# Patient Record
Sex: FEMALE | Race: BLACK OR AFRICAN AMERICAN | NOT HISPANIC OR LATINO | Employment: STUDENT | URBAN - METROPOLITAN AREA
[De-identification: names, ages, dates, MRNs, and addresses within clinical notes are randomized per-mention and may not be internally consistent; named-entity substitution may affect disease eponyms.]

---
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
## 2018-02-08 ENCOUNTER — OFFICE VISIT (OUTPATIENT)
Dept: PSYCHIATRY | Facility: CLINIC | Age: 18
End: 2018-02-08
Attending: PSYCHOLOGIST
Payer: COMMERCIAL

## 2018-02-08 DIAGNOSIS — F43.10 PTSD (POST-TRAUMATIC STRESS DISORDER): Primary | ICD-10-CM

## 2018-02-08 NOTE — MR AVS SNAPSHOT
After Visit Summary   2/8/2018    Lakisha Calixto    MRN: [id number]           Patient Information     Date Of Birth          2000        Visit Information        Provider Department      2/8/2018 4:00 PM [facility]      Today's Diagnoses     PTSD (post-traumatic stress disorder)    -  1       Follow-ups after your visit        Your next 10 appointments already scheduled     Mar 08, 2018  4:00 PM CST   Adult Psychotherapy with Maire Ledezma   Psychiatry Clinic ([facility])    [facility]  [address]  [address] [address]   [phone]            Mar 15, 2018  4:00 PM CDT   Adult Psychotherapy with Marie Ledezma   Psychiatry Clinic ([facility])    [facility]  [address]  [address] [address]   [phone]            Mar 22, 2018  4:00 PM CDT   Adult Psychotherapy with Marie Ledezma   Psychiatry Clinic ([facility])    [facility]  [address]  [address] [address]   [phone]            Mar 29, 2018  4:00 PM CDT   Adult Psychotherapy with Marie Ledezma   Psychiatry Clinic ([facility])    [facility]  [address]  [address] [address]   [phone]            Apr 05, 2018  4:00 PM CDT   Adult Psychotherapy with Marie Ledezma   Psychiatry Clinic ([facility])    [facility]  [address]  [address] [address]   [phone]            Apr 12, 2018  4:00 PM CDT   Adult Psychotherapy with Marie Ledezma   Psychiatry Clinic ([facility])    [facility]  [address]  [address] [address]   [phone]            Apr 19, 2018  4:00 PM CDT   Adult Psychotherapy with Marie Ledezma   Psychiatry Clinic ([facility])    [facility]  [address]  [address]  [facility] [id number] [phone]            2018  4:00 PM CDT   Adult Psychotherapy with Marie Ledezma   Psychiatry Clinic ([facility])    [address]  [address] [id number] [phone]              Who to contact     Please call your clinic at [phone] to:    Ask questions about your health    Make or cancel appointments    Discuss your medicines    Learn about your test results    Speak to your doctor            Additional Information About Your Visit        Pathwork DiagnosticsharCrossChx Information     Proficiency is an electronic gateway that provides easy, online access to your medical records. With Proficiency, you can request a clinic appointment, read your test results, renew a prescription or communicate with your care team.     To sign up for Proficiency visit the website at [email or web address]   You will be asked to enter the access code listed below, as well as some personal information. Please follow the directions to create your username and password.     Your access code is: [email or web address]  Expires: 2018  3:05 PM     Your access code will  in 90 days. If you need help or a new code, please contact your [facility] or call [phone] for assistance.        Care EveryWhere ID     This is your Care EveryWhere ID. This could be used by other organizations to access your [place] medical records  [id number]         Blood Pressure from Last 3 Encounters:   17 124/77   17 115/76   16 133/80    Weight from Last 3 Encounters:   17 106.2 kg (234 lb 3.2 oz) (>99 %)*   17 102.9 kg (226 lb 12.8 oz) (99 %)*   16 100.4 kg (221 lb 6.4 oz) (99 %)*     * Growth percentiles are based on CDC 2-20 Years data.              Today, you had the following     No orders found for display       Primary Care Provider Office Phone # Fax #    Deb Wood [phone]        [facility] [address] [place] [address]        Equal Access to Services     UGO CLEMENS : Hadii aad ku hadlillyalexis Cisneros, wachanellda bulmaro, qaybta kaclarencewood issa, fermin wileynormannader lanza. So [facility] [phone].    ATENCIÓN: Si habla español, tiene a herrera disposición servicios gratuitos de asistencia lingüística. [facility] [phone].    We comply with applicable federal civil rights laws and [place] laws. We do not discriminate on the basis of race, color, national origin, age, disability, sex, sexual orientation, or gender identity.            Thank you!     Thank you for choosing PSYCHIATRY CLINIC  for your care. Our goal is always to provide you with excellent care. Hearing back from our patients is one way we can continue to improve our services. Please take a few minutes to complete the written survey that you may receive in the mail after your visit with us. Thank you!             Your Updated Medication List - Protect others around you: Learn how to safely use, store and throw away your medicines at www.disposemymeds.org.          This list is accurate as of 2/8/18 11:59 PM.  Always use your most recent med list.                   Brand Name Dispense Instructions for use Diagnosis    acetaminophen 325 MG tablet    TYLENOL    100 tablet    Take 2 tablets (650 mg) by mouth every 4 hours as needed for mild pain        albuterol 108 (90 BASE) MCG/ACT Inhaler    PROAIR HFA/PROVENTIL HFA/VENTOLIN HFA    1 Inhaler    Inhale 2 puffs into the lungs every 6 hours as needed for shortness of breath / dyspnea or wheezing (before activity)        cyclobenzaprine 10 MG tablet    FLEXERIL    30 tablet    Take 0.5-1 tablets (5-10 mg) by mouth 3 times daily as needed for muscle spasms    Lumbar sprain, initial encounter       ferrous sulfate 325 (65 FE) MG tablet    IRON    60 tablet    Take 1 tablet (325 mg) by mouth 2 times daily        ibuprofen 200 MG capsule     60 capsule     Take 200 mg by mouth every 4 hours as needed for fever        levonorgestrel-ethinyl estradiol 0.1-20 MG-MCG per tablet    AVIANE,ALESSE,LESSINA    84 tablet    Take 1 tablet by mouth daily Take at first 1 pill every 6 hours until bleeding slows down.        vitamin D 2000 UNITS tablet     100 tablet    Take 2,000 Units by mouth daily

## 2018-02-15 ENCOUNTER — OFFICE VISIT (OUTPATIENT)
Dept: PSYCHIATRY | Facility: CLINIC | Age: 18
End: 2018-02-15
Attending: PSYCHOLOGIST
Payer: COMMERCIAL

## 2018-02-15 DIAGNOSIS — F43.10 POSTTRAUMATIC STRESS DISORDER: Primary | ICD-10-CM

## 2018-02-15 NOTE — MR AVS SNAPSHOT
After Visit Summary   2/15/2018    Lakisha Calixto    MRN: [id number]           Patient Information     Date Of Birth          2000        Visit Information        Provider Department      2/15/2018 4:00 PM [facility]        Today's Diagnoses     Posttraumatic stress disorder    -  1       Follow-ups after your visit        Your next 10 appointments already scheduled     Mar 15, 2018  4:00 PM CDT   Adult Psychotherapy with Marie Ledezma   Psychiatry Clinic ([facility])    [facility]  [address]  [address] [address]   [phone]            Mar 22, 2018  4:00 PM CDT   Adult Psychotherapy with Marie Ledezma   Psychiatry Clinic ([facility])    [facility]  [address]  [address] [address]   [phone]            Mar 29, 2018  4:00 PM CDT   Adult Psychotherapy with Marie Ledezma   Psychiatry Clinic ([facility])    [facility]  [address]  [address] [address]   [phone]            Apr 05, 2018  4:00 PM CDT   Adult Psychotherapy with Marie Ledezma   Psychiatry Clinic ([facility])    [facility]  [address]  [address] [address]   [phone]            Apr 12, 2018  4:00 PM CDT   Adult Psychotherapy with Marie Ledezma   Psychiatry Clinic ([facility])    [facility]  [address]  [address] [address]   [phone]            Apr 19, 2018  4:00 PM CDT   Adult Psychotherapy with Marie Ledezma   Psychiatry Clinic ([facility])    [facility]  [address]  [address] [address]   [phone]            Apr 26, 2018  4:00 PM CDT   Adult Psychotherapy with Marie Ledezma   Psychiatry Clinic ([facility])    [facility]  [address]  [address]  [id number] [phone]              Who to contact     Please call your clinic at [phone] to:    Ask questions about your health    Make or cancel appointments    Discuss your medicines    Learn about your test results    Speak to your doctor            Additional Information About Your Visit        MyChart Information     Restalot is an electronic gateway that provides easy, online access to your medical records. With Floored, you can request a clinic appointment, read your test results, renew a prescription or communicate with your care team.     To sign up for Floored visit the website at [email or web address]   You will be asked to enter the access code listed below, as well as some personal information. Please follow the directions to create your username and password.     Your access code is: [email or web address]  Expires: 2018  3:05 PM     Your access code will  in 90 days. If you need help or a new code, please contact your [facility] or call [phone] for assistance.        Care EveryWhere ID     This is your Care EveryWhere ID. This could be used by other organizations to access your [facility] medical records  [id number]         Blood Pressure from Last 3 Encounters:   17 124/77   17 115/76   16 133/80    Weight from Last 3 Encounters:   17 106.2 kg (234 lb 3.2 oz) (>99 %)*   17 102.9 kg (226 lb 12.8 oz) (99 %)*   16 100.4 kg (221 lb 6.4 oz) (99 %)*     * Growth percentiles are based on CDC 2-20 Years data.              We Performed the Following     PSYCHOLOGICAL TEST BY PSYCHOLOGIST/MD, PER HR        Primary Care Provider Office Phone # Fax #    Deb Wood [phone]       [facility] [address] [place] [address]        Equal Access to Services     UGO CLEMENS AH: Ben Cisneros, wachanellda luqadaha, qaybta kafermin chavez  ah. So [facility] [phone].    ATENCIÓN: Si shawna morgan, tiene a herrera disposición servicios gratuitos de asistencia lingüística. Tri wallace [phone].    We comply with applicable federal civil rights laws and [place] laws. We do not discriminate on the basis of race, color, national origin, age, disability, sex, sexual orientation, or gender identity.            Thank you!     Thank you for choosing PSYCHIATRY CLINIC  for your care. Our goal is always to provide you with excellent care. Hearing back from our patients is one way we can continue to improve our services. Please take a few minutes to complete the written survey that you may receive in the mail after your visit with us. Thank you!             Your Updated Medication List - Protect others around you: Learn how to safely use, store and throw away your medicines at www.disposemymeds.org.          This list is accurate as of 2/15/18 11:59 PM.  Always use your most recent med list.                   Brand Name Dispense Instructions for use Diagnosis    acetaminophen 325 MG tablet    TYLENOL    100 tablet    Take 2 tablets (650 mg) by mouth every 4 hours as needed for mild pain        albuterol 108 (90 BASE) MCG/ACT Inhaler    PROAIR HFA/PROVENTIL HFA/VENTOLIN HFA    1 Inhaler    Inhale 2 puffs into the lungs every 6 hours as needed for shortness of breath / dyspnea or wheezing (before activity)        cyclobenzaprine 10 MG tablet    FLEXERIL    30 tablet    Take 0.5-1 tablets (5-10 mg) by mouth 3 times daily as needed for muscle spasms    Lumbar sprain, initial encounter       ferrous sulfate 325 (65 FE) MG tablet    IRON    60 tablet    Take 1 tablet (325 mg) by mouth 2 times daily        ibuprofen 200 MG capsule     60 capsule    Take 200 mg by mouth every 4 hours as needed for fever        levonorgestrel-ethinyl estradiol 0.1-20 MG-MCG per tablet    AVIANE,ALESSE,LESSINA    84 tablet    Take 1 tablet by mouth daily Take at first 1 pill every 6 hours until  bleeding slows down.        vitamin D 2000 UNITS tablet     100 tablet    Take 2,000 Units by mouth daily

## 2018-02-16 ENCOUNTER — TELEPHONE (OUTPATIENT)
Dept: PSYCHIATRY | Facility: CLINIC | Age: 18
End: 2018-02-16

## 2018-02-16 NOTE — TELEPHONE ENCOUNTER
On 2/8/2018 the patient signed a PHI authorizing phone messages to be left for her regarding scheduling, and medical information.  The form also authorizes Person to Person communication with Halima Carreno, mother and Froilan, stepfather for scheduling, and medical information.  I sent this document to scanning on 2/16/2018 and kept a copy in Psychiatry until scanning is complete/confirmed. Deana Castro LPN

## 2018-02-22 ENCOUNTER — OFFICE VISIT (OUTPATIENT)
Dept: PSYCHIATRY | Facility: CLINIC | Age: 18
End: 2018-02-22
Attending: PSYCHOLOGIST
Payer: COMMERCIAL

## 2018-02-22 DIAGNOSIS — F43.10 POSTTRAUMATIC STRESS DISORDER: Primary | ICD-10-CM

## 2018-02-22 NOTE — MR AVS SNAPSHOT
After Visit Summary   2/22/2018    Lakisha Calixto    MRN: [id number]           Patient Information     Date Of Birth          2000        Visit Information        Provider Department      2/22/2018 4:00 PM [facility]      Today's Diagnoses     Posttraumatic stress disorder    -  1       Follow-ups after your visit        Your next 10 appointments already scheduled     Mar 15, 2018  4:00 PM CDT   Adult Psychotherapy with Marie Ledezma   Psychiatry Clinic ([facility])    [facility]  [address]  [address] [address]   [phone]            Mar 22, 2018  4:00 PM CDT   Adult Psychotherapy with Marie Ledezma   Psychiatry Clinic ([facility])    [facility]  [address]  [address] [address] [phone]            Mar 29, 2018  4:00 PM CDT   Adult Psychotherapy with Marie Ledezma   Psychiatry Clinic ([facility])    [facility]  [address]  [address] [address] [phone]            Apr 05, 2018  4:00 PM CDT   Adult Psychotherapy with Marie Ledezma   Psychiatry Clinic ([facility])    [facility]  [address]  [address] [address] [phone]            Apr 12, 2018  4:00 PM CDT   Adult Psychotherapy with Marie Ledezma   Psychiatry Clinic ([facility])    [facility]  [address]  [address] [address]   [phone]            Apr 19, 2018  4:00 PM CDT   Adult Psychotherapy with Marie Ledezma   Psychiatry Clinic ([facility])    [facility]  [address]  [address] [address]   [phone]            Apr 26, 2018  4:00 PM CDT   Adult Psychotherapy with Marie Ledezma   Psychiatry Clinic ([facility])    [facility]  [address]  [address]  [facility] [address] [phone]              Who to contact     Please call your clinic at [phone] to:    Ask questions about your health    Make or cancel appointments    Discuss your medicines    Learn about your test results    Speak to your doctor            Additional Information About Your Visit        MyChart Information     Better ATM Serviceshart is an electronic gateway that provides easy, online access to your medical records. With [employer], you can request a clinic appointment, read your test results, renew a prescription or communicate with your care team.     To sign up for Secure Fortresst visit the website at [email or web address]   You will be asked to enter the access code listed below, as well as some personal information. Please follow the directions to create your username and password.     Your access code is: [email or web address]  Expires: 2018  4:05 PM     Your access code will  in 90 days. If you need help or a new code, please contact your [facility] or call [phone] for assistance.        Care EveryWhere ID     This is your Care EveryWhere ID. This could be used by other organizations to access your [facility] medical records  [id number]         Blood Pressure from Last 3 Encounters:   17 124/77   17 115/76   16 133/80    Weight from Last 3 Encounters:   17 106.2 kg (234 lb 3.2 oz) (>99 %)*   17 102.9 kg (226 lb 12.8 oz) (99 %)*   16 100.4 kg (221 lb 6.4 oz) (99 %)*     * Growth percentiles are based on CDC 2-20 Years data.              Today, you had the following     No orders found for display       Primary Care Provider Office Phone # Fax #    Deb Wood [phone]       [facility] [address] [place] [address]        Equal Access to Services     UGO CLEMENS AH: Ben Cisneros, wacarley chamberlain, qaybta fermin orlando  laernie lanza. So [facility] [phone].    ATENCIÓN: Si ryannla eddie, tiene a herrera disposición servicios gratuitos de asistencia lingüística. Tri wallace [phone].    We comply with applicable federal civil rights laws and [place] laws. We do not discriminate on the basis of race, color, national origin, age, disability, sex, sexual orientation, or gender identity.            Thank you!     Thank you for choosing PSYCHIATRY CLINIC  for your care. Our goal is always to provide you with excellent care. Hearing back from our patients is one way we can continue to improve our services. Please take a few minutes to complete the written survey that you may receive in the mail after your visit with us. Thank you!             Your Updated Medication List - Protect others around you: Learn how to safely use, store and throw away your medicines at www.disposemymeds.org.          This list is accurate as of 2/22/18 11:59 PM.  Always use your most recent med list.                   Brand Name Dispense Instructions for use Diagnosis    acetaminophen 325 MG tablet    TYLENOL    100 tablet    Take 2 tablets (650 mg) by mouth every 4 hours as needed for mild pain        albuterol 108 (90 BASE) MCG/ACT Inhaler    PROAIR HFA/PROVENTIL HFA/VENTOLIN HFA    1 Inhaler    Inhale 2 puffs into the lungs every 6 hours as needed for shortness of breath / dyspnea or wheezing (before activity)        cyclobenzaprine 10 MG tablet    FLEXERIL    30 tablet    Take 0.5-1 tablets (5-10 mg) by mouth 3 times daily as needed for muscle spasms    Lumbar sprain, initial encounter       ferrous sulfate 325 (65 FE) MG tablet    IRON    60 tablet    Take 1 tablet (325 mg) by mouth 2 times daily        ibuprofen 200 MG capsule     60 capsule    Take 200 mg by mouth every 4 hours as needed for fever        vitamin D 2000 UNITS tablet     100 tablet    Take 2,000 Units by mouth daily

## 2018-02-22 NOTE — PROGRESS NOTES
"CLINICAL PROGRESS NOTE      SESSION TYPE: Individual psychotherapy ([address])  LENGTH OF SESSION: 50 minutes  START TIME: 4:00 PM  STOP TIME: 4:50 PM  DIAGNOSES:   (F43.10) Posttraumatic Stress Disorder  PARTICIPANTS: Halima Lucas (mother), and Marie Ledezma MA (therapist)  SUPERVISOR: Francesca Ye, PhD LP    Subjective: Cristiano presented for her first session with her mother, Halima Calixto. Halima and Cristiano have concerns about past trauma surrounding her father's arrest, incarceration, and resulting lack of contact.     Treatment: A clinical interview was conducted with Cristiano in order to assess for different symptoms in different domains of functioning. Cristiano was a fair , although she was often unable to answer more detailed questions or items and would sometimes shrug. Halima completed questionnaires while she waited and Cristiano took questionnaires home.     Assessment: Cristiano presented as casually dressed and appropriately groomed. Eye contact was appropriate. She appeared shy and reticent to engage, but warmed up during the session and  engaged easily towards the end of the interview.  Mood was \"good\" and affect was full range and appropriate to content of speech. Attention and concentration were within normal limits. No abnormal movements were noted. Speech was normal in volume, rate and rhythm. Cristiano sometimes had difficulty answering questions or elaborating with more detail. Insight and judgment were developmentally appropriate. No suicidal ideation reported.     Plan: Cristiano will return next week 2/15/18 to continue the interview.     I did not see this pt directly. This pt was discussed with me in individual psychotherapy supervision, and I agree with the plan as documented.    Francesca Ye    "

## 2018-03-01 ENCOUNTER — OFFICE VISIT (OUTPATIENT)
Dept: PSYCHIATRY | Facility: CLINIC | Age: 18
End: 2018-03-01
Attending: PSYCHOLOGIST
Payer: COMMERCIAL

## 2018-03-01 DIAGNOSIS — F43.10 PTSD (POST-TRAUMATIC STRESS DISORDER): Primary | ICD-10-CM

## 2018-03-01 NOTE — MR AVS SNAPSHOT
After Visit Summary   3/1/2018    Lakisha Calixto    MRN: [id number]           Patient Information     Date Of Birth          2000        Visit Information        Provider Department      3/1/2018 4:00 PM [facility]      Today's Diagnoses     PTSD (post-traumatic stress disorder)    -  1       Follow-ups after your visit        Your next 10 appointments already scheduled     Mar 15, 2018  4:00 PM CDT   Adult Psychotherapy with Marie Ledezma   Psychiatry Clinic ([facility])    [facility]  [address]  [address] [address]   [phone]            Mar 22, 2018  4:00 PM CDT   Adult Psychotherapy with Marie Ledezma   Psychiatry Clinic ([facility])    [facility]  [address]  [address] [address]   [phone]            Mar 29, 2018  4:00 PM CDT   Adult Psychotherapy with Marie Ledezma   Psychiatry Clinic ([facility])    [facility]  [address]  [address] [address]   [phone]            Apr 05, 2018  4:00 PM CDT   Adult Psychotherapy with Marie Ledezma   Psychiatry Clinic ([facility])    [facility]  [address]  [address] [address]   [phone]            Apr 12, 2018  4:00 PM CDT   Adult Psychotherapy with Marie Ledezma   Psychiatry Clinic ([facility])    [facility]  [address]  [address] [address]   [phone]            Apr 19, 2018  4:00 PM CDT   Adult Psychotherapy with Marie Ledezma   Psychiatry Clinic ([facility])    [facility]  [address]  [address] [address]   [phone]            Apr 26, 2018  4:00 PM CDT   Adult Psychotherapy with Marie Ledezma   Psychiatry Clinic ([facility])    [facility]  [address]  [address]  [facility] [address] [phone]              Who to contact     Please call your clinic at [phone] to:    Ask questions about your health    Make or cancel appointments    Discuss your medicines    Learn about your test results    Speak to your doctor            Additional Information About Your Visit        MyChart Information     Sustainable Life Mediahart is an electronic gateway that provides easy, online access to your medical records. With Cloud [employer]istics, you can request a clinic appointment, read your test results, renew a prescription or communicate with your care team.     To sign up for Guerrilla RFt visit the website at [email or web address]   You will be asked to enter the access code listed below, as well as some personal information. Please follow the directions to create your username and password.     Your access code is: [email or web address]  Expires: 2018  3:05 PM     Your access code will  in 90 days. If you need help or a new code, please contact your [facility] or call [phone] for assistance.        Care EveryWhere ID     This is your Care EveryWhere ID. This could be used by other organizations to access your [facility] medical records  [id number]         Blood Pressure from Last 3 Encounters:   17 124/77   17 115/76   16 133/80    Weight from Last 3 Encounters:   17 106.2 kg (234 lb 3.2 oz) (>99 %)*   17 102.9 kg (226 lb 12.8 oz) (99 %)*   16 100.4 kg (221 lb 6.4 oz) (99 %)*     * Growth percentiles are based on CDC 2-20 Years data.              Today, you had the following     No orders found for display       Primary Care Provider Office Phone # Fax #    Deb Wood [phone]       [facility] [address] [place] [address]        Equal Access to Services     UGO CLEMENS AH: Ben Cisneros, wacarley chamberlain, qaybfermin trejo  laernie lanza. So [facility] [phone].    ATENCIÓN: Si shawna morgan, tiene a herrera disposición servicios gratuitos de asistencia lingüística. Tri wallace [phone].    We comply with applicable federal civil rights laws and [place] laws. We do not discriminate on the basis of race, color, national origin, age, disability, sex, sexual orientation, or gender identity.            Thank you!     Thank you for choosing PS[facility]CHIATRY CLINIC  for your care. Our goal is always to provide you with excellent care. Hearing back from our patients is one way we can continue to improve our services. Please take a few minutes to complete the written survey that you may receive in the mail after your visit with us. Thank you!             Your Updated Medication List - Protect others around you: Learn how to safely use, store and throw away your medicines at www.disposemymeds.org.          This list is accurate as of 3/1/18 11:59 PM.  Always use your most recent med list.                   Brand Name Dispense Instructions for use Diagnosis    acetaminophen 325 MG tablet    TYLENOL    100 tablet    Take 2 tablets (650 mg) by mouth every 4 hours as needed for mild pain        albuterol 108 (90 BASE) MCG/ACT Inhaler    PROAIR HFA/PROVENTIL HFA/VENTOLIN HFA    1 Inhaler    Inhale 2 puffs into the lungs every 6 hours as needed for shortness of breath / dyspnea or wheezing (before activity)        cyclobenzaprine 10 MG tablet    FLEXERIL    30 tablet    Take 0.5-1 tablets (5-10 mg) by mouth 3 times daily as needed for muscle spasms    Lumbar sprain, initial encounter       ferrous sulfate 325 (65 FE) MG tablet    IRON    60 tablet    Take 1 tablet (325 mg) by mouth 2 times daily        ibuprofen 200 MG capsule     60 capsule    Take 200 mg by mouth every 4 hours as needed for fever        levonorgestrel-ethinyl estradiol 0.1-20 MG-MCG per tablet    AVIANE,ALESSE,LESSINA    84 tablet    Take 1 tablet by mouth daily Take at first 1 pill every 6 hours  until bleeding slows down.        vitamin D 2000 UNITS tablet     100 tablet    Take 2,000 Units by mouth daily

## 2018-03-02 NOTE — PROGRESS NOTES
"CLINICAL PROGRESS NOTE      SESSION TYPE: Individual psychotherapy (96883)  LENGTH OF SESSION: 60 minutes  START TIME: 5:00 PM  STOP TIME: 6:00 PM  DIAGNOSES:   (F43.10) Posttraumatic Stress Disorder  PARTICIPANTS: Cristiano [place] and Marie Ledezma MA (therapist)  SUPERVISOR: Francesca Ye, PhD LP  Subjective: Cristiano reported that she had found out her sister was pregnant. She had suspected it but was still surprised when her sister told her. She is excited to have a niece or nephew but the family is stressed because her sister's boyfriend has been on and off, and the pregnancy was unexpected. Cristiano was looking forward to seeing her sister soon, as she was coming into town.      Treatment: Cristiano and the clinician talked about how they were going to address her PTSD symptoms through therapy. There are three parts: talking about the traumatic event, experiments, and negative thoughts. The clinician provided psycho education about each portion, and discussed benefits and challenges to this approach. In particular, she and Cristiano talked about how talking about trauma meant that it would be difficult and uncomfortable at first, but her symptoms would improve after. Cristiano was able to make a connection to a previous experience she had, of experiencing grief when her aunt . We also discussed what resources and strengths she brought to therapy.      Assessment: Cristiano presented as casually dressed and appropriately groomed. Eye contact was appropriate. She engaged easily and was responsive in session. Mood was \"good\" and Cristiano appeared relaxed throughout the session. Affect was full range and appropriate to content of speech. Attention and concentration were within normal limits. No abnormal movements were noted. Speech was normal in volume, rate and rhythm. Insight and judgment were developmentally appropriate. No suicidal ideation reported.     Plan: Cristiano returns next week 3/8/18.         I did not " see this pt directly. This pt was discussed with me in individual psychotherapy supervision, and I agree with the plan as documented.     Francesca Ye
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
## 2018-03-13 ENCOUNTER — TELEPHONE (OUTPATIENT)
Dept: PSYCHIATRY | Facility: CLINIC | Age: 18
End: 2018-03-13

## 2018-03-13 DIAGNOSIS — Z30.41 ENCOUNTER FOR SURVEILLANCE OF CONTRACEPTIVE PILLS: Primary | ICD-10-CM

## 2018-03-13 RX ORDER — LEVONORGESTREL/ETHIN.ESTRADIOL 0.1-0.02MG
1 TABLET ORAL DAILY
Qty: 84 TABLET | Refills: 0 | Status: SHIPPED | OUTPATIENT
Start: 2018-03-13 | End: 2018-05-30

## 2018-03-13 NOTE — TELEPHONE ENCOUNTER
On 2/22/2018 the patient signed an CONSUELO authorizing the release of information (medical opinion) from [facility] to [facility].  The medical opinion was faxed by John Ye on 3/8/2018 to [facility] at [phone] per fax cover sheet.  I sent the CONSUELO and the medical opinion document to scanning , held copies in Psychiatry until scanning complete and routed  this note to APOLLO Ye on 3/13/2018.Kristen Richter/GARCIA

## 2018-03-13 NOTE — PROGRESS NOTES
"CLINICAL PROGRESS NOTE      SESSION TYPE: Individual psychotherapy (49669)  LENGTH OF SESSION: 50 minutes  START TIME: 4:00 PM  STOP TIME: 4:50 PM  DIAGNOSES:   (F43.10) Posttraumatic Stress Disorder  PARTICIPANTS: Cristiano Littlemoreland, and Marie Ledezma MA (therapist)  SUPERVISOR: Francesca Ye, PhD LP    Subjective: Cristiano reported her week had gone well, but that nothing interesting had occurred.     Treatment: The clinician continued discussing the treatment plan with Cristiano. Cristiano discussed goals that she wanted to pursue in therapy, including smoking cessation and being closer to her siblings. The clinician provided psycho education about Cristiano's diagnosis, and what goal might be particularly well suited to the treatment approach. Cristiano had few questions about her diagnosis, in particular because her sister also has the diagnosis. The clinician and Cristiano discussed different symptoms of PTSD, and why she met criteria. Cristiano agreed with the symptoms and was able to identify other symptoms or behaviors that were potentially trauma responses. Cristiano additionally discussed the focal trauma in a bit more detail.     Assessment: Cristiano presented as casually dressed and appropriately groomed. Eye contact was appropriate. She appeared shy and reticent to engage, but warmed up during the session and  engaged easily towards the end of the interview.  Mood was \"good\" and affect was full range and appropriate to content of speech. Attention and concentration were within normal limits. No abnormal movements were noted. Speech was normal in volume, rate and rhythm. Cristiano sometimes had difficulty answering questions or elaborating with more detail. Insight and judgment were developmentally appropriate. No suicidal ideation reported.     TREATMENT PLAN WILL BE DUE FOR REVIEW 5/21/18    Plan: Cristiano will return next week 3/1/18.     I did not see this pt directly. This pt was discussed with me in individual " psychotherapy supervision, and I agree with the plan as documented.    Francesca Ye

## 2018-03-15 ENCOUNTER — OFFICE VISIT (OUTPATIENT)
Dept: PSYCHIATRY | Facility: CLINIC | Age: 18
End: 2018-03-15
Attending: PSYCHOLOGIST
Payer: COMMERCIAL

## 2018-03-15 ENCOUNTER — TELEPHONE (OUTPATIENT)
Dept: PSYCHIATRY | Facility: CLINIC | Age: 18
End: 2018-03-15

## 2018-03-15 DIAGNOSIS — F43.10 PTSD (POST-TRAUMATIC STRESS DISORDER): Primary | ICD-10-CM

## 2018-03-15 NOTE — MR AVS SNAPSHOT
After Visit Summary   3/15/2018    Lakisha Calixto    MRN: [id number]           Patient Information     Date Of Birth          2000        Visit Information        Provider Department      3/15/2018 4:00 PM Marie Ledezma [facility]      Today's Diagnoses     PTSD (post-traumatic stress disorder)    -  1       Follow-ups after your visit        Your next 10 appointments already scheduled     Mar 22, 2018  4:00 PM CDT   Adult Psychotherapy with Marie Ledezma   Psychiatry Clinic ([facility])    [facility]  [address]  [address] [address]   [phone]            Mar 29, 2018  4:00 PM CDT   Adult Psychotherapy with Marie Ledezma   Psychiatry Clinic ([facility])    [facility]  [address]  [address] [address]   [phone]            Apr 05, 2018  4:00 PM CDT   Adult Psychotherapy with Marie Ledezma   Psychiatry Clinic ([facility])    [facility]  [address]  [address] [address] [phone]            Apr 12, 2018  4:00 PM CDT   Adult Psychotherapy with Marie Ledezma   Psychiatry Clinic ([facility])    [facility]  [address]  [address] [address] [phone]            Apr 19, 2018  4:00 PM CDT   Adult Psychotherapy with Marie Ledezma   Psychiatry Clinic ([facility])    [facility]  [address]  [address] [address] [phone]            Apr 26, 2018  4:00 PM CDT   Adult Psychotherapy with Marie Ledezma   Psychiatry Clinic ([facility])    [facility]  [address]  [address] [address] [phone]              Who to contact     Please call your clinic at [phone] to:    Ask questions about your health    Make or cancel appointments    Discuss your medicines    Learn  about your test results    Speak to your doctor            Additional Information About Your Visit        MyChart Information     [employer]art is an electronic gateway that provides easy, online access to your medical records. With [employer], you can request a clinic appointment, read your test results, renew a prescription or communicate with your care team.     To sign up for [employer] visit the website at [email or web address]   You will be asked to enter the access code listed below, as well as some personal information. Please follow the directions to create your username and password.     Your access code is: [email or web address]  Expires: 2018  4:05 PM     Your access code will  in 90 days. If you need help or a new code, please contact your [facility] or call [phone] for assistance.        Care EveryWhere ID     This is your Care EveryWhere ID. This could be used by other organizations to access your [facility] medical records  [id number]         Blood Pressure from Last 3 Encounters:   17 124/77   17 115/76   16 133/80    Weight from Last 3 Encounters:   17 106.2 kg (234 lb 3.2 oz) (>99 %)*   17 102.9 kg (226 lb 12.8 oz) (99 %)*   16 100.4 kg (221 lb 6.4 oz) (99 %)*     * Growth percentiles are based on [facility] 2-20 Years data.              Today, you had the following     No orders found for display       Primary Care Provider Office Phone # Fax #    Debkaye Wood [phone]       [facility] [address] [place] [address]        Equal Access to Services     UGO CLEMENS : Hadpadmini Cisneros, kulwant chamberlain, fermin champion. So [facility] [phone].    ATENCIÓN: Si habla español, tiene a herrera disposición servicios gratuitos de asistencia lingüística. Llame al [phone].    We comply with applicable federal civil rights laws and [place]  laws. We do not discriminate on the basis of race, color, national origin, age, disability, sex, sexual orientation, or gender identity.            Thank you!     Thank you for choosing PSYCHIATRY CLINIC  for your care. Our goal is always to provide you with excellent care. Hearing back from our patients is one way we can continue to improve our services. Please take a few minutes to complete the written survey that you may receive in the mail after your visit with us. Thank you!             Your Updated Medication List - Protect others around you: Learn how to safely use, store and throw away your medicines at www.disposemymeds.org.          This list is accurate as of 3/15/18 11:59 PM.  Always use your most recent med list.                   Brand Name Dispense Instructions for use Diagnosis    acetaminophen 325 MG tablet    TYLENOL    100 tablet    Take 2 tablets (650 mg) by mouth every 4 hours as needed for mild pain        albuterol 108 (90 BASE) MCG/ACT Inhaler    PROAIR HFA/PROVENTIL HFA/VENTOLIN HFA    1 Inhaler    Inhale 2 puffs into the lungs every 6 hours as needed for shortness of breath / dyspnea or wheezing (before activity)        cyclobenzaprine 10 MG tablet    FLEXERIL    30 tablet    Take 0.5-1 tablets (5-10 mg) by mouth 3 times daily as needed for muscle spasms    Lumbar sprain, initial encounter       ferrous sulfate 325 (65 FE) MG tablet    IRON    60 tablet    Take 1 tablet (325 mg) by mouth 2 times daily        ibuprofen 200 MG capsule     60 capsule    Take 200 mg by mouth every 4 hours as needed for fever        levonorgestrel-ethinyl estradiol 0.1-20 MG-MCG per tablet    AVIANE,ALESSE,LESSINA    84 tablet    Take 1 tablet by mouth daily Take at first 1 pill every 6 hours until bleeding slows down.    Encounter for surveillance of contraceptive pills       vitamin D 2000 UNITS tablet     100 tablet    Take 2,000 Units by mouth daily

## 2018-03-16 NOTE — PROGRESS NOTES
"CLINICAL PROGRESS NOTE      SESSION TYPE: Individual psychotherapy (57801)  LENGTH OF SESSION: 55 minutes  START TIME: 4:00 PM  STOP TIME: 4:55 PM  DIAGNOSES:   (F43.10) Posttraumatic Stress Disorder  PARTICIPANTS: Cristiano Littlemoreland and Marie Ledezma MA (therapist)  SUPERVISOR: Francesca Ye, PhD LP  Subjective: Cristiano reported that she had missed her appointment last week because her mother had a heart attack. Cristiano was asleep when it happened and woke up to find that her mom had been taken to the hospital. She reported that it had been scary and that she had a hard time visiting her mom at the hospital because it is the same one that her aunt and uncle  at. Her mom is now home, and Cristiano is reporting that her siblings are working together to take care of her.     Treatment: Cristiano and the clinician talked about different PTSD symptoms that develop from traumatic events. Cristiano appeared to have a difficult time paying attention in session and needed a lot of prompting and redirection. Cristiano had a hard time connecting the symptoms to her experiences, and sometimes made connections to other experiences that were not related to her focal trauma. The clinician asked if any of these symptoms had been occurring since her mother's heart attack, but Cristiano denied that it was happening. The clinician also reviewed past homework which included breathing exercises. Cristiano said she had tried it and that it had helped some, and that she was willing to keep trying it. The clinician also taught Cristiano PMR, which Cristiano said was \"ok.\"      Assessment: Cristiano presented as casually dressed and appropriately groomed. Eye contact was appropriate. She engaged easily and was responsive in session, although she appeared distracted and needed prompting and re-direction. Mood was \"good\" and Cristiano appeared relaxed throughout the session. Affect was full range and appropriate to content of speech. Attention and " concentration wavered. No abnormal movements were noted. Speech was normal in volume, rate and rhythm. Insight and judgment were developmentally appropriate. No suicidal ideation reported.     Plan: Na'Ignacia returns next week 3/22/18.      TREATMENT PLAN WILL BE DUE FOR REVIEW 5/21/18     I did not see this pt directly. This pt was discussed with me in individual psychotherapy supervision, and I agree with the plan as documented.     Francesca Ye

## 2018-03-22 ENCOUNTER — OFFICE VISIT (OUTPATIENT)
Dept: PSYCHIATRY | Facility: CLINIC | Age: 18
End: 2018-03-22
Attending: PSYCHOLOGIST
Payer: COMMERCIAL

## 2018-03-22 DIAGNOSIS — F43.10 PTSD (POST-TRAUMATIC STRESS DISORDER): Primary | ICD-10-CM

## 2018-03-22 NOTE — MR AVS SNAPSHOT
After Visit Summary   3/22/2018    Lakisha Calixto    MRN: [id number]           Patient Information     Date Of Birth          2000        Visit Information        Provider Department      3/22/2018 4:00 PM [facility]edez[facility] PSYCHIATRY      Today's Diagnoses     PTSD (post-traumatic stress disorder)    -  1       Follow-ups after your visit        Your next 10 appointments already scheduled     Mar 29, 2018  4:00 PM CDT   Adult Psychotherapy with Marie Ledezma   Psychiatry Clinic ([facility])    [address] [place] [address]  [address] [address]   [phone]            Apr 05, 2018  4:00 PM CDT   Adult Psychotherapy with Marie Ledezma   Psychiatry Clinic ([facility])    [address] [place] [address]  [address] [address] [phone]            Apr 12, 2018  4:00 PM CDT   Adult Psychotherapy with Marie Ledezma   Psychiatry Clinic ([facility])    [address] [place] [address]  [address] [address] [phone]            Apr 19, 2018  4:00 PM CDT   Adult Psychotherapy with Marie Ledezma   Psychiatry Clinic ([facility])    [address] [place] [address]  [address] [address] [phone]            Apr 26, 2018  4:00 PM CDT   Adult Psychotherapy with Marie K Darien   Psychiatry Clinic ([facility])    [address] [place] [address]  [address] [address] [phone]              Who to contact     Please call your clinic at [phone] to:    Ask questions about your health    Make or cancel appointments    Discuss your medicines    Learn about your test results    Speak to your doctor            Additional Information About Your Visit        MyChart Information     Heidi Shaulis is an electronic gateway that provides easy, online access to your medical records.  With ActionIQ, you can request a clinic appointment, read your test results, renew a prescription or communicate with your care team.     To sign up for ActionIQ visit the website at [email or web address]   You will be asked to enter the access code listed below, as well as some personal information. Please follow the directions to create your username and password.     Your access code is: [email or web address]  Expires: 2018  4:05 PM     Your access code will  in 90 days. If you need help or a new code, please contact your [facility] or call [phone] for assistance.        Care EveryWhere ID     This is your Care EveryWhere ID. This could be used by other organizations to access your [place] [facility] medical records  [id number]         Blood Pressure from Last 3 Encounters:   17 124/77   17 115/76   16 133/80    Weight from Last 3 Encounters:   17 106.2 kg (234 lb 3.2 oz) (>99 %)*   17 102.9 kg (226 lb 12.8 oz) (99 %)*   16 100.4 kg (221 lb 6.4 oz) (99 %)*     * Growth percentiles are based on [facility] 2-20 Years data.              Today, you had the following     No orders found for display       Primary Care Provider Office Phone # Fax #    Deb Wood [phone]       [facility] [address] [place] [address]        Equal Access to Services     UGO CLEMENS AH: Hadii aad ku hadasho Soomaali, waaxda luqadaha, qaybta kaalmada adeegyada, fermin lanza. So [facility] [phone].    ATENCIÓN: Si habla español, tiene a herrera disposición servicios gratuitos de asistencia lingüística. Llame al [phone].    We comply with applicable federal civil rights laws and [place] laws. We do not discriminate on the basis of race, color, national origin, age, disability, sex, sexual orientation, or gender identity.            Thank you!     Thank you for choosing PSYCHIATRY CLINIC  for your care. Our  goal is always to provide you with excellent care. Hearing back from our patients is one way we can continue to improve our services. Please take a few minutes to complete the written survey that you may receive in the mail after your visit with us. Thank you!             Your Updated Medication List - Protect others around you: Learn how to safely use, store and throw away your medicines at www.disposemymeds.org.          This list is accurate as of 3/22/18 11:59 PM.  Always use your most recent med list.                   Brand Name Dispense Instructions for use Diagnosis    acetaminophen 325 MG tablet    TYLENOL    100 tablet    Take 2 tablets (650 mg) by mouth every 4 hours as needed for mild pain        albuterol 108 (90 BASE) MCG/ACT Inhaler    PROAIR HFA/PROVENTIL HFA/VENTOLIN HFA    1 Inhaler    Inhale 2 puffs into the lungs every 6 hours as needed for shortness of breath / dyspnea or wheezing (before activity)        cyclobenzaprine 10 MG tablet    FLEXERIL    30 tablet    Take 0.5-1 tablets (5-10 mg) by mouth 3 times daily as needed for muscle spasms    Lumbar sprain, initial encounter       ferrous sulfate 325 (65 FE) MG tablet    IRON    60 tablet    Take 1 tablet (325 mg) by mouth 2 times daily        ibuprofen 200 MG capsule     60 capsule    Take 200 mg by mouth every 4 hours as needed for fever        levonorgestrel-ethinyl estradiol 0.1-20 MG-MCG per tablet    AVIANE,ALESSE,LESSINA    84 tablet    Take 1 tablet by mouth daily Take at first 1 pill every 6 hours until bleeding slows down.    Encounter for surveillance of contraceptive pills       vitamin D 2000 UNITS tablet     100 tablet    Take 2,000 Units by mouth daily

## 2018-03-23 NOTE — PROGRESS NOTES
"CLINICAL PROGRESS NOTE      SESSION TYPE: Individual psychotherapy (40865)  LENGTH OF SESSION: 50 minutes  START TIME: 4:00 PM  STOP TIME: 4:50 PM  DIAGNOSES:   (F43.10) Posttraumatic Stress Disorder  PARTICIPANTS: Cristiano Salomonland and Marie Ledezma MA (therapist)  SUPERVISOR: Francesca Ye, PhD LP  Subjective: Cristiano reported that her week was going well. She was going to visit her older sister this weekend, which she was looking forward to. She was planning on spending time with her nephew and was looking forward to a change of pace. She reported that her mom was still on bed rest, but was doing better, and had gone to the doctor that day.     Treatment: Cristiano and the clinician discussed the last session a bit. The clinician had noted that Cristiano seemed distracted last session. Cristiano reported that she hadn't noticed it. The clinician offered different reasons why she might have been distracted, and provided psychoeducation about acute stress reactions. Cristiano reported that she was likely just distracted, and that it did not have to do with her mom having a heart attack. The clinician also asked about what other things Cristiano would like to be doing now that they would begin \"real-life experiments\" to help with her PTSD symptoms. Cristiano is not experiencing many avoidance symptoms, but has difficulty  from her mom.  We discussed other things that Cristiano would like to be doing so that she is not with her mom all the time. Cristiano had difficulty generating a list of activities. The clinician asked if Cristiano would like to be working. She agreed, but wanted to wait until she starts classes in June to find a part time job. Cristiano reported wanting to go out clubbing with her siblings more, and thought that spending some time volunteering might be enjoyable. The clinician asked Cristiano about if it was uncomfortable for her to be away from her mother, for example at the appointment or with her " "sister over the weekend. Cristiano denied that it was difficult, and that any discomfort was due to her mother currently being ill.      Assessment: Cristiano presented as casually dressed and appropriately groomed. Eye contact was appropriate. She engaged easily and was responsive in session. Mood was \"good\" and Cristiano appeared relaxed throughout the session. Affect was full range and appropriate to content of speech. Attention and concentration were within the normal limits. No abnormal movements were noted. Speech was normal in volume, rate and rhythm. Insight and judgment were developmentally appropriate. No suicidal ideation reported.     Plan: Cristiano returns next week 3/29/18.      TREATMENT PLAN WILL BE DUE FOR REVIEW 5/21/18     I did not see this pt directly. This pt was discussed with me in individual psychotherapy supervision, and I agree with the plan as documented.     Francesca Ye  "

## 2018-03-29 ENCOUNTER — OFFICE VISIT (OUTPATIENT)
Dept: PSYCHIATRY | Facility: CLINIC | Age: 18
End: 2018-03-29
Attending: PSYCHOLOGIST
Payer: COMMERCIAL

## 2018-03-29 DIAGNOSIS — F43.10 PTSD (POST-TRAUMATIC STRESS DISORDER): Primary | ICD-10-CM

## 2018-03-29 NOTE — MR AVS SNAPSHOT
After Visit Summary   3/29/2018    Lakisha Calixto    MRN: [id number]           Patient Information     Date Of Birth          2000        Visit Information        Provider Department      3/29/2018 4:00 PM Marie [facility]zma [facility] PSYCHIATRY      Today's Diagnoses     PTSD (post-traumatic stress disorder)    -  1       Follow-ups after your visit        Your next 10 appointments already scheduled     Apr 05, 2018  4:00 PM CDT   Adult Psychotherapy with Marie Ledezma   Psychiatry Clinic ([facility])    [address] [place] [address]  [address] [address]   [phone]            Apr 12, 2018  4:00 PM CDT   Adult Psychotherapy with Marie K Darien   Psychiatry Clinic ([facility])    [address] [place] [address]  [address] [address]   [phone]            Apr 19, 2018  4:00 PM CDT   Adult Psychotherapy with Marie SHARPE Darien   Psychiatry Clinic ([facility])    [address] [place] [address]  [address] [address] [phone]            Apr 26, 2018  4:00 PM CDT   Adult Psychotherapy with Marie SHARPE Darien   Psychiatry Clinic ([facility])    [address] [place] [address]  [address] [address] [phone]              Who to contact     Please call your clinic at [phone] to:    Ask questions about your health    Make or cancel appointments    Discuss your medicines    Learn about your test results    Speak to your doctor            Additional Information About Your Visit        MyChart Information     Orange Leap is an electronic gateway that provides easy, online access to your medical records. With Orange Leap, you can request a clinic appointment, read your test results, renew a prescription or communicate with your care team.     To sign up for [employer] visit the website at [email or web address]   You will be  asked to enter the access code listed below, as well as some personal information. Please follow the directions to create your username and password.     Your access code is: [email or web address]  Expires: 2018  4:05 PM     Your access code will  in 90 days. If you need help or a new code, please contact your [facility] or call [phone] for assistance.        Care EveryWhere ID     This is your Care EveryWhere ID. This could be used by other organizations to access your [facility] medical records  [id number]         Blood Pressure from Last 3 Encounters:   17 124/77   17 115/76   16 133/80    Weight from Last 3 Encounters:   17 106.2 kg (234 lb 3.2 oz) (>99 %)*   17 102.9 kg (226 lb 12.8 oz) (99 %)*   16 100.4 kg (221 lb 6.4 oz) (99 %)*     * Growth percentiles are based on [facility] 2-20 Years data.              Today, you had the following     No orders found for display       Primary Care Provider Office Phone # Fax #    Debkaye Wood [phone]       [facility] [address] [place] [address]        Equal Access to Services     UGO CLEMENS AH: Hadii maría edwards hadasho Soomaali, waaxda luqadaha, qaybta kaalmada adeegyada, fermin hawk . So [facility] [phone].    ATENCIÓN: Si habla español, tiene a herrera disposición servicios gratuitos de asistencia lingüística. Llame al [phone].    We comply with applicable federal civil rights laws and [place] laws. We do not discriminate on the basis of race, color, national origin, age, disability, sex, sexual orientation, or gender identity.            Thank you!     Thank you for choosing PSYCHIATRY CLINIC  for your care. Our goal is always to provide you with excellent care. Hearing back from our patients is one way we can continue to improve our services. Please take a few minutes to complete the written survey that you may receive in the mail  after your visit with us. Thank you!             Your Updated Medication List - Protect others around you: Learn how to safely use, store and throw away your medicines at www.disposemymeds.org.          This list is accurate as of 3/29/18 11:59 PM.  Always use your most recent med list.                   Brand Name Dispense Instructions for use Diagnosis    acetaminophen 325 MG tablet    TYLENOL    100 tablet    Take 2 tablets (650 mg) by mouth every 4 hours as needed for mild pain        albuterol 108 (90 BASE) MCG/ACT Inhaler    PROAIR HFA/PROVENTIL HFA/VENTOLIN HFA    1 Inhaler    Inhale 2 puffs into the lungs every 6 hours as needed for shortness of breath / dyspnea or wheezing (before activity)        cyclobenzaprine 10 MG tablet    FLEXERIL    30 tablet    Take 0.5-1 tablets (5-10 mg) by mouth 3 times daily as needed for muscle spasms    Lumbar sprain, initial encounter       ferrous sulfate 325 (65 FE) MG tablet    IRON    60 tablet    Take 1 tablet (325 mg) by mouth 2 times daily        ibuprofen 200 MG capsule     60 capsule    Take 200 mg by mouth every 4 hours as needed for fever        levonorgestrel-ethinyl estradiol 0.1-20 MG-MCG per tablet    AVIANE,ALESSE,LESSINA    84 tablet    Take 1 tablet by mouth daily Take at first 1 pill every 6 hours until bleeding slows down.    Encounter for surveillance of contraceptive pills       vitamin D 2000 UNITS tablet     100 tablet    Take 2,000 Units by mouth daily

## 2018-03-29 NOTE — Clinical Note
Would any of you consider an eval for a 17 year-old? 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 There is a history of chronic complex trauma, the full extent of which we likely do not know. 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 She reportedly loved going to school, but is currently not in college (scheduled to start in the summer).   I fear there is something cognitive that is impacting her ability to comprehend and engage in therapy (for example, she didn't understand the concept of an exposure hierarchy) and that this same challenge may negatively impact the return to school or work (she quit a job due to being overwhelmed).   I know 17 is out of your age range and I haven't described anything medical, so I understand if you all decline. Thanks!

## 2018-03-30 NOTE — PROGRESS NOTES
"CLINICAL PROGRESS NOTE      SESSION TYPE: Individual psychotherapy (45643)  LENGTH OF SESSION: 45 minutes  START TIME: 4:00 PM  STOP TIME: 4:45 PM  DIAGNOSES:   (F43.10) Posttraumatic Stress Disorder  PARTICIPANTS: Cristiano Durga and Marie Ledezma MA (therapist)  SUPERVISOR: Francesca Ye, PhD LP  Subjective: Cristiano reported that her week was going well. She spent the weekend at her sister's house and said it was ok, although she did not elaborate. She also reported that her mother had had surgery on Tuesday and had additional stents put in. It was a bit surprising to Cristiano since she had not been going to appointments with her mom. [facility]. Some family was coming over and all the kids were going to cook the dinner since her mother was sick.     Treatment: Cristiano and the clinician discussed different activities that Cristiano could be doing outside of the house. The clinician asked Cristiano if she could register for classes soon. Cristiano reported that she was planning on doing it this weekend because someone named S from [facility] [employer] [facility] was going to help her. The clinician asked her why S was helping her register. Cristiano was worried of making a mistake and registering for the wrong classes, and reports that she had filled out the incorrect financial aid form, which is why she had to postpone school until June. Cristiano did not think she could register for classes without the help of S.     The clinician and Cristiano also talked about doing real-life experiments to spend more time with friends outside of the house. Cristiano worried that friends might do something \"bad\" to her, and worried that people outside of the home would do something \"bad\" to her as well. That is why she does not like leaving the house alone. She and the clinician attempted to make a fear hierarchy together, but Cristiano had difficulty understanding the concept. As homework, the clinician and Cristiano " "brainstormed who Cristiano could potentially reach out to. Cristiano quickly identified a best friend from high school whom she keeps in contact via facebook as someone she would feel comfortable texting.     Assessment: Cristiano presented as casually dressed and appropriately groomed. Eye contact was appropriate. She engaged easily and was responsive in session. Mood was \"good\" and Cristiano appeared relaxed throughout the session. Affect was full range and appropriate to content of speech. Attention and concentration were within the normal limits. No abnormal movements were noted. Speech was normal in volume, rate and rhythm. Insight and judgment were developmentally appropriate. No suicidal ideation reported.     Plan: Cristiano returns next week 4/5/18.      TREATMENT PLAN WILL BE DUE FOR REVIEW 5/21/18     I did not see this pt directly. This pt was discussed with me in individual psychotherapy supervision, and I agree with the plan as documented.     Francesca Ye  "

## 2018-04-05 ENCOUNTER — OFFICE VISIT (OUTPATIENT)
Dept: PSYCHIATRY | Facility: CLINIC | Age: 18
End: 2018-04-05
Attending: PSYCHOLOGIST
Payer: COMMERCIAL

## 2018-04-05 DIAGNOSIS — F43.10 PTSD (POST-TRAUMATIC STRESS DISORDER): Primary | ICD-10-CM

## 2018-04-05 NOTE — MR AVS SNAPSHOT
After Visit Summary   4/5/2018    Lakisha Calixto    MRN: [id number]           Patient Information     Date Of Birth          2000        Visit Information        Provider Department      4/5/2018 4:00 PM Marie [facility]edezma [facility] PSYCHIATRY      Today's Diagnoses     PTSD (post-traumatic stress disorder)    -  1       Follow-ups after your visit        Your next 10 appointments already scheduled     Apr 12, 2018  4:00 PM CDT   Adult Psychotherapy with Marie K Darien   Psychiatry Clinic ([facility])    [place] [id number]  [address] [address]   [phone]            Apr 19, 2018  4:00 PM CDT   Adult Psychotherapy with Marie SHARPE Darien   Psychiatry Clinic ([facility])    [place] [id number]  [address] [address] [phone]            Apr 26, 2018  4:00 PM CDT   Adult Psychotherapy with Marie SHARPE Darien   Psychiatry Clinic ([facility])    [place] [id number]  [address] [address] [phone]              Who to contact     Please call your clinic at [phone] to:    Ask questions about your health    Make or cancel appointments    Discuss your medicines    Learn about your test results    Speak to your doctor            Additional Information About Your Visit        MyChart Information     Mirna Therapeuticst is an electronic gateway that provides easy, online access to your medical records. With Peak, you can request a clinic appointment, read your test results, renew a prescription or communicate with your care team.     To sign up for [employer] The[employer] visit the website at [email or web address]   You will be asked to enter the access code listed below, as well as some personal information. Please follow the directions to create your username and password.     Your access code is: [email or web address]  Expires: 7/6/2018  4:24 PM     Your  access code will  in 90 days. If you need help or a new code, please contact your [facility] or call [phone] for assistance.        Care EveryWhere ID     This is your Care EveryWhere ID. This could be used by other organizations to access your [facility] medical records  [id number]         Blood Pressure from Last 3 Encounters:   17 124/77   17 115/76   16 133/80    Weight from Last 3 Encounters:   17 106.2 kg (234 lb 3.2 oz) (>99 %)*   17 102.9 kg (226 lb 12.8 oz) (99 %)*   16 100.4 kg (221 lb 6.4 oz) (99 %)*     * Growth percentiles are based on [facility] 2-20 Years data.              Today, you had the following     No orders found for display       Primary Care Provider Office Phone # Fax #    Deb DALTON MD Nina [phone]       [facility] [address] [place] [address]        Equal Access to Services     UGO CLEMENS : Hadii aad ku hadasho Soomaali, waaxda luqadaha, qaybta kaalmada adeegyada, fermin hawk . So [facility] [phone].    ATENCIÓN: Si habla español, tiene a herrera disposición servicios gratuitos de asistencia lingüística. Llame al [phone].    We comply with applicable federal civil rights laws and [place] laws. We do not discriminate on the basis of race, color, national origin, age, disability, sex, sexual orientation, or gender identity.            Thank you!     Thank you for choosing PSYCHIATRY CLINIC  for your care. Our goal is always to provide you with excellent care. Hearing back from our patients is one way we can continue to improve our services. Please take a few minutes to complete the written survey that you may receive in the mail after your visit with us. Thank you!             Your Updated Medication List - Protect others around you: Learn how to safely use, store and throw away your medicines at www.disposemymeds.org.          This list is  accurate as of 4/5/18 11:59 PM.  Always use your most recent med list.                   Brand Name Dispense Instructions for use Diagnosis    acetaminophen 325 MG tablet    TYLENOL    100 tablet    Take 2 tablets (650 mg) by mouth every 4 hours as needed for mild pain        albuterol 108 (90 BASE) MCG/ACT Inhaler    PROAIR HFA/PROVENTIL HFA/VENTOLIN HFA    1 Inhaler    Inhale 2 puffs into the lungs every 6 hours as needed for shortness of breath / dyspnea or wheezing (before activity)        cyclobenzaprine 10 MG tablet    FLEXERIL    30 tablet    Take 0.5-1 tablets (5-10 mg) by mouth 3 times daily as needed for muscle spasms    Lumbar sprain, initial encounter       ferrous sulfate 325 (65 FE) MG tablet    IRON    60 tablet    Take 1 tablet (325 mg) by mouth 2 times daily        ibuprofen 200 MG capsule     60 capsule    Take 200 mg by mouth every 4 hours as needed for fever        levonorgestrel-ethinyl estradiol 0.1-20 MG-MCG per tablet    AVIANE,ALESSE,LESSINA    84 tablet    Take 1 tablet by mouth daily Take at first 1 pill every 6 hours until bleeding slows down.    Encounter for surveillance of contraceptive pills       vitamin D 2000 UNITS tablet     100 tablet    Take 2,000 Units by mouth daily

## 2018-04-06 NOTE — PROGRESS NOTES
"CLINICAL PROGRESS NOTE      SESSION TYPE: Individual psychotherapy (86898)  LENGTH OF SESSION: 45 minutes  START TIME: 4:00 PM  STOP TIME: 4:45 PM  DIAGNOSES:   (F43.10) Posttraumatic Stress Disorder  PARTICIPANTS: Cristiano Dugra and Marie Ledezma MA (therapist)  SUPERVISOR: Francesca Ye, PhD LP  Subjective: Cristiano reported that her week had gone ok. She reported that things had been ok during Easter, although she did not have the chance to cook. She was looking forward to her mom getting out of bed and going grocery shopping with a few of them this weekend.     Treatment: Cristiano and the clinician discussed different activities that Cristiano could be doing outside of the house. The last session the clinician had suggested that Cristiano try contacting one of her old friends. Cristaino said that her friend actually contacted her via facebook during the weekend and they had chatted a bit. Cristiano reported that she had registered for courses this weekend with S's help, but was unable to recall the courses she had registered. She reported that English had been her favorite course in high school, but had trouble remembering a book she had read in class. She was excited about going back to college and having homework again. She reported enjoying homework because it \"gets her mind off of things.\" She reported that her stepfather would be driving her and her sister to college every day. The clinician and Cristiano developed a new exposure hierarchy with index cards that Cristiano put together. She had already completed the first step of contacting a friend, the second step was going to the mall with siblings. She thought she could do that this weekend.     Assessment: Cristiano presented as casually dressed and appropriately groomed. Eye contact was appropriate. She engaged easily and was responsive in session. Mood was \"good\" and Cristiano appeared relaxed throughout the session. Affect was full range and appropriate to " content of speech. Attention and concentration were within the normal limits. No abnormal movements were noted. Speech was normal in volume, rate and rhythm. Insight and judgment were developmentally appropriate. No suicidal ideation reported.     Plan: Cristiano returns next in two weeks 4/19/18.      TREATMENT PLAN WILL BE DUE FOR REVIEW 5/21/18     I did not see this pt directly. This pt was discussed with me in individual psychotherapy supervision, and I agree with the plan as documented.     Francesca Ye

## 2018-04-19 ENCOUNTER — OFFICE VISIT (OUTPATIENT)
Dept: PSYCHIATRY | Facility: CLINIC | Age: 18
End: 2018-04-19
Attending: PSYCHOLOGIST
Payer: COMMERCIAL

## 2018-04-19 DIAGNOSIS — F43.10 PTSD (POST-TRAUMATIC STRESS DISORDER): Primary | ICD-10-CM

## 2018-04-19 NOTE — MR AVS SNAPSHOT
After Visit Summary   2018    Lakisha Calixto    MRN: [id number]           Patient Information     Date Of Birth          2000        Visit Information        Provider Department      2018 4:00 PM [facility]      Today's Diagnoses     PTSD (post-traumatic stress disorder)    -  1       Follow-ups after your visit        Your next 10 appointments already scheduled     2018  4:00 PM CDT   Adult Psychotherapy with Marie Ledezma   Psychiatry Clinic ([facility])    [address]  [address] [id number] [phone]              Who to contact     Please call your clinic at [phone] to:    Ask questions about your health    Make or cancel appointments    Discuss your medicines    Learn about your test results    Speak to your doctor            Additional Information About Your Visit        MyChart Information     Stemt is an electronic gateway that provides easy, online access to your medical records. With [employer], you can request a clinic appointment, read your test results, renew a prescription or communicate with your care team.     To sign up for Stemt visit the website at [email or web address]   You will be asked to enter the access code listed below, as well as some personal information. Please follow the directions to create your username and password.     Your access code is: [email or web address]  Expires: 2018  4:24 PM     Your access code will  in 90 days. If you need help or a new code, please contact your [facility] or call [phone] for assistance.        Care EveryWhere ID     This is your Care EveryWhere ID. This could be used by other organizations to access your [place] medical records  [id number]         Blood Pressure from Last 3 Encounters:   17 124/77   17 115/76   16 133/80    Weight from Last 3  Encounters:   12/20/17 106.2 kg (234 lb 3.2 oz) (>99 %)*   04/19/17 102.9 kg (226 lb 12.8 oz) (99 %)*   09/23/16 100.4 kg (221 lb 6.4 oz) (99 %)*     * Growth percentiles are based on [facility] 2-20 Years data.              Today, you had the following     No orders found for display       Primary Care Provider Office Phone # Fax #    Deb Wood [phone]       [facility] [address] [place] [address]        Equal Access to Services     BARBRA [facility] : Hadii maría edwards hadtoshia Soyareli, waaxda bulmaro, qaybta kaalmawood issa, fermin hawk . So [facility] [phone].    ATENCIÓN: Si habla español, tiene a herrera disposición servicios gratuitos de asistencia lingüística. [facility] [phone].    We comply with applicable federal civil rights laws and [place] laws. We do not discriminate on the basis of race, color, national origin, age, disability, sex, sexual orientation, or gender identity.            Thank you!     Thank you for choosing PSYCHIATRY CLINIC  for your care. Our goal is always to provide you with excellent care. Hearing back from our patients is one way we can continue to improve our services. Please take a few minutes to complete the written survey that you may receive in the mail after your visit with us. Thank you!             Your Updated Medication List - Protect others around you: Learn how to safely use, store and throw away your medicines at www.disposemymeds.org.          This list is accurate as of 4/19/18 11:59 PM.  Always use your most recent med list.                   Brand Name Dispense Instructions for use Diagnosis    acetaminophen 325 MG tablet    TYLENOL    100 tablet    Take 2 tablets (650 mg) by mouth every 4 hours as needed for mild pain        albuterol 108 (90 Base) MCG/ACT Inhaler    PROAIR HFA/PROVENTIL HFA/VENTOLIN HFA    1 Inhaler    Inhale 2 puffs into the lungs every 6 hours as needed for shortness of breath /  dyspnea or wheezing (before activity)        cyclobenzaprine 10 MG tablet    FLEXERIL    30 tablet    Take 0.5-1 tablets (5-10 mg) by mouth 3 times daily as needed for muscle spasms    Lumbar sprain, initial encounter       ferrous sulfate 325 (65 Fe) MG tablet    IRON    60 tablet    Take 1 tablet (325 mg) by mouth 2 times daily        ibuprofen 200 MG capsule     60 capsule    Take 200 mg by mouth every 4 hours as needed for fever        levonorgestrel-ethinyl estradiol 0.1-20 MG-MCG per tablet    AVIANE,ALESSE,LESSINA    84 tablet    Take 1 tablet by mouth daily Take at first 1 pill every 6 hours until bleeding slows down.    Encounter for surveillance of contraceptive pills       vitamin D 2000 units tablet     100 tablet    Take 2,000 Units by mouth daily
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
## 2018-04-26 ENCOUNTER — OFFICE VISIT (OUTPATIENT)
Dept: PSYCHIATRY | Facility: CLINIC | Age: 18
End: 2018-04-26
Attending: PSYCHOLOGIST
Payer: COMMERCIAL

## 2018-04-26 DIAGNOSIS — F43.10 PTSD (POST-TRAUMATIC STRESS DISORDER): Primary | ICD-10-CM

## 2018-04-26 NOTE — PATIENT INSTRUCTIONS
Thank you for coming to the PSYCHIATRY CLINIC.    Lab Testing:  If you had lab testing today and your results are reassuring or normal they will be mailed to you or sent through Bownty within 7 days.   If the lab tests need quick action we will call you with the results.  The phone number we will call with results is # [phone] (home) . If this is not the best number please call our clinic and change the number.    Medication Refills:  If you need any refills please call your pharmacy and they will contact us. Our fax number for refills is [phone]. Please allow three business for refill processing.   If you need to  your refill at a new pharmacy, please contact the new pharmacy directly. The new pharmacy will help you get your medications transferred.     Scheduling:  If you have any concerns about today's visit or wish to schedule another appointment please call our office during normal business hours [phone] (8-5:00 M-F)    Contact Us:  Please call [phone] during business hours (8-5:00 M-F).  If after clinic hours, or on the weekend, please call  [phone].    Financial Assistance [phone]  [employer] Billing [phone]  [employer] Billing [phone]  Medical Records [phone]      MENTAL HEALTH CRISIS NUMBERS:  [facility]:   [facility] - [phone]   Crisis Residence [facility] - [phone]   Walk-In Counseling [facility] [phone]   COPE 24/7 [facility] Mobile Team for Adults - [[phone]]; Child - [[phone]]     Crisis Connection - [phone]     [facility]:   [facility] - [phone]   Walk-in counseling [facility] - [phone]   Walk-in counseling [facility] - [phone]   Crisis Residence [facility] - [phone]   Urgent Care Adult Mental Health:   --Drop-in, 24/7 crisis line, and [place] Mobile Team [[phone]]    CRISIS TEXT  LINE: Text [phone] from anywhere, anytime, any crisis 24/7;    OR SEE www.crisistextline.org     Poison Control Center - [phone]    CHILD: [facility] needs assessment team - [phone]     [facility] - [phone]; or [facility] Lif[facility] - [phone]    If you have a medical emergency please call 911or go to the nearest ER.                    _____________________________________________    Again thank you for choosing PSYCH[facility]ATRY CLINIC and please let us know how we can best partner with you to improve you and your family's health.  You may be receiving a survey in the mail regarding this appointment. We would love to have your feedback, both positive and negative, so please fill out the survey and return it using the provided envolpe. The survey is done by an external company, so your answers are anonymous.

## 2018-04-26 NOTE — MR AVS SNAPSHOT
After Visit Summary   4/26/2018    Lakisha Calixto    MRN: [id number]           Patient Information     Date Of Birth          2000        Visit Information        Provider Department      4/26/2018 4:00 PM [facility]      Today's Diagnoses     PTSD (post-traumatic stress disorder)    -  1      Care Instructions    Thank you for coming to the PSYCHIATRY CLINIC.    Lab Testing:  If you had lab testing today and your results are reassuring or normal they will be mailed to you or sent through [employer]ai within 7 days.   If the lab tests need quick action we will call you with the results.  The phone number we will call with results is # [phone] (home) . If this is not the best number please call our clinic and change the number.    Medication Refills:  If you need any refills please call your pharmacy and they will contact us. Our fax number for refills is [phone]. Please allow three business for refill processing.   If you need to  your refill at a new pharmacy, please contact the new pharmacy directly. The new pharmacy will help you get your medications transferred.     Scheduling:  If you have any concerns about today's visit or wish to schedule another appointment please call our office during normal business hours [phone] (8-5:00 M-F)    Contact Us:  Please call [phone] during business hours (8-5:00 M-F).  If after clinic hours, or on the weekend, please call  [phone].    Financial Assistance [phone]  [employer] Billing [phone]  [facility] Billing [phone]  Medical Records [phone]      MENTAL HEALTH CRISIS NUMBERS:  [facility]:   [facility] - [phone]   Crisis Residence [facility] - [phone]   Walk-In Counseling Center [facility] - [phone]   COPE 24/7 [place] Mobile Team for Adults - [[phone]]; Child - [[phone]]     Crisis Connection - [phone]      [facility]:   [facility] - [phone]   Walk-in counseling [facility] - [phone]   Walk-in counseling [facility] - [phone]   Crisis Residence [facility] - [phone]   Urgent Care Adult Mental Health:   --Drop-in, 24/7 crisis line, and [employer] Mobile Team [[phone]]    CRISIS TEXT LINE: Text 175-308 from anywhere, anytime, any crisis 24/7;    OR SEE www.crisistextline.org     Poison Control Center - [phone]    CHILD: [facility] needs assessment team - [phone]     [facility] Lif[facility] - [phone]; or [employer] Lifeline - [phone]    If you have a medical emergency please call 911or go to the nearest ER.                    _____________________________________________    Again thank you for choosing PSYCHIATRY CLINIC and please let us know how we can best partner with you to improve you and your family's health.  You may be receiving a survey in the mail regarding this appointment. We would love to have your feedback, both positive and negative, so please fill out the survey and return it using the provided envolpe. The survey is done by an external company, so your answers are anonymous.           Follow-ups after your visit        Your next 10 appointments already scheduled     May 10, 2018  3:30 PM CDT   Child Psychotherapy with Laura Vasquez, PhD   Psychiatry Clinic ([facility])    [address] [address] [phone]            May 17, 2018  3:30 PM CDT   Child Psychotherapy with Laura Vasquez, PhD   Psychiatry Clinic ([facility])    [address] [place] [address]  [address] [phone]            May 24, 2018  3:30 PM CDT   Child Psychotherapy with Laura Vasquez, PhD   Psychiatry Clinic ([facility])    [address] [place]  [address]  [address] [address] [phone]            May 31, 2018  3:30 PM CDT   Child Psychotherapy with Laura Vasquez, PhD   Psychiatry Clinic ([facility])    [address]  [address] [phone]              Who to contact     Please call your clinic at [phone] to:    Ask questions about your health    Make or cancel appointments    Discuss your medicines    Learn about your test results    Speak to your doctor            Additional Information About Your Visit        Babybehart Information     [employer] is an electronic gateway that provides easy, online access to your medical records. With [employer], you can request a clinic appointment, read your test results, renew a prescription or communicate with your care team.     To sign up for [employer] visit the website at [email or web address]   You will be asked to enter the access code listed below, as well as some personal information. Please follow the directions to create your username and password.     Your access code is: [email or web address]  Expires: 2018  4:24 PM     Your access code will  in 90 days. If you need help or a new code, please contact your [facility] or call [phone] for assistance.        Care EveryWhere ID     This is your Care EveryWhere ID. This could be used by other organizations to access your [facility] medical records  [id number]         Blood Pressure from Last 3 Encounters:   17 124/77   17 115/76   16 133/80    Weight from Last 3 Encounters:   17 106.2 kg (234 lb 3.2 oz) (>99 %)*   17 102.9 kg (226 lb 12.8 oz) (99 %)*   16 100.4 kg (221 lb 6.4 oz) (99 %)*     * Growth percentiles are based on CDC 2-20 Years data.              Today, you had the following     No orders found for display       Primary Care Provider Office Phone # Fax #    Deb L  MD Nina [phone]       [address] [place] [address]        Equal Access to Services     UGO CLEMENS : Hadii aad ku hadlillyalexis Carmelinayareli, wachanellda renaldoodellha, qalosta kaclarenceda maryjolisandrowood, fermin nazin hayaacolten sibleyjazmine flower kenn lanza. So [facility] [phone].    ATENCIÓN: Si habla español, tiene a herrera disposición servicios gratuitos de asistencia lingüística. Tri al [phone].    We comply with applicable federal civil rights laws and [place] laws. We do not discriminate on the basis of race, color, national origin, age, disability, sex, sexual orientation, or gender identity.            Thank you!     Thank you for choosing [facility]ATRY CLINIC  for your care. Our goal is always to provide you with excellent care. Hearing back from our patients is one way we can continue to improve our services. Please take a few minutes to complete the written survey that you may receive in the mail after your visit with us. Thank you!             Your Updated Medication List - Protect others around you: Learn how to safely use, store and throw away your medicines at www.disposemymeds.org.          This list is accurate as of 4/26/18 11:59 PM.  Always use your most recent med list.                   Brand Name Dispense Instructions for use Diagnosis    acetaminophen 325 MG tablet    TYLENOL    100 tablet    Take 2 tablets (650 mg) by mouth every 4 hours as needed for mild pain        albuterol 108 (90 Base) MCG/ACT Inhaler    PROAIR HFA/PROVENTIL HFA/VENTOLIN HFA    1 Inhaler    Inhale 2 puffs into the lungs every 6 hours as needed for shortness of breath / dyspnea or wheezing (before activity)        cyclobenzaprine 10 MG tablet    FLEXERIL    30 tablet    Take 0.5-1 tablets (5-10 mg) by mouth 3 times daily as needed for muscle spasms    Lumbar sprain, initial encounter       ferrous sulfate 325 (65 Fe) MG tablet    IRON    60 tablet    Take 1 tablet (325 mg) by mouth 2 times daily        ibuprofen 200 MG capsule      60 capsule    Take 200 mg by mouth every 4 hours as needed for fever        levonorgestrel-ethinyl estradiol 0.1-20 MG-MCG per tablet    AVIANE,ALESSE,LESSINA    84 tablet    Take 1 tablet by mouth daily Take at first 1 pill every 6 hours until bleeding slows down.    Encounter for surveillance of contraceptive pills       vitamin D 2000 units tablet     100 tablet    Take 2,000 Units by mouth daily

## 2018-04-27 NOTE — PROGRESS NOTES
"CLINICAL PROGRESS NOTE      SESSION TYPE: Individual psychotherapy (58934)  LENGTH OF SESSION: 50 minutes  START TIME: 4:00 PM  STOP TIME: 4:50 PM  DIAGNOSES:   (F43.10) Posttraumatic Stress Disorder  PARTICIPANTS: Cristiano Calixto and Marie Ledezma MA (therapist)  SUPERVISOR: Francesca Ye, PhD LP  Subjective: Cristiano reported that she had spent the week in bed recovering from being sick. She was feeling better but had not done much recently. She also reported that her birthday was coming up and she was excited for it.     Treatment: Cristiano and the clinician discussed the transfer to the new clinician. They discussed some of her initial goals and the clinician asked if Cristiano had any new goals or anything the new clinician should know. Cristiano did not but wanted Laura to know that she was a \"quiet person.\" Cristiano chatted more freely this session than others, and appeared to be in a good mood. The clinician reflected that back and asked her what was different. Cristiano could not pinpoint anything and said that it was probably that her birthday was coming up. Cristiano reported being excited about college and the three classes she signed up for (ASL online, reading and writing). She had made sure her classes were on the same day as her sister so that they could carpool together. She said she had not been out of the house recently due to being sick and had likewise not spoken with her friends. She hoped that she would feel better this weekend and be able to do more.     Assessment: Cristiano presented as casually dressed and appropriately groomed. Eye contact was appropriate. She engaged easily and was responsive in session. Mood was \"good\" and Cristiano appeared relaxed throughout the session. Affect was full range and appropriate to content of speech. Attention and concentration were within the normal limits. No abnormal movements were noted. Speech was normal in volume, rate and rhythm. Insight and judgment " were developmentally appropriate. No suicidal ideation reported.     Plan: Cristiano returns in one week to meet with her new clinician, Dr. Vasquez.      TREATMENT PLAN WILL BE DUE FOR REVIEW 5/21/18     I did not see this pt directly. This pt was discussed with me in individual psychotherapy supervision, and I agree with the plan as documented.     Francesca Ye

## 2018-05-03 ENCOUNTER — OFFICE VISIT (OUTPATIENT)
Dept: PSYCHIATRY | Facility: CLINIC | Age: 18
End: 2018-05-03
Attending: PSYCHOLOGIST
Payer: COMMERCIAL

## 2018-05-03 DIAGNOSIS — F43.10 POSTTRAUMATIC STRESS DISORDER: Primary | ICD-10-CM
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
## 2018-05-03 NOTE — MR AVS SNAPSHOT
After Visit Summary   5/3/2018    Lakisha Calixto    MRN: [id number]           Patient Information     Date Of Birth          2000        Visit Information        Provider Department      5/3/2018 3:30 PM Laura Vasquez, PhD Psychiatry Clinic        Today's Diagnoses     Posttraumatic stress disorder    -  1       Follow-ups after your visit        Your next 10 appointments already scheduled     May 10, 2018  3:30 PM CDT   Child Psychotherapy with Laura Vasquez, PhD   Psychiatry Clinic ([facility])    [address] [place] [address]  [address] [address] [phone]            May 17, 2018  3:30 PM CDT   Child Psychotherapy with Laura Vasquez, PhD   Psychiatry Clinic ([facility])    [address] [place] [address]  [address] [id number] [phone]            May 24, 2018  3:30 PM CDT   Child Psychotherapy with Laura Vasquez, PhD   Psychiatry Clinic ([facility])    [address] [place] [address]  [address] [address] [phone]            May 31, 2018  3:30 PM CDT   Child Psychotherapy with Laura Vasquez, PhD   Psychiatry Clinic ([facility])    [address]  [facility] [address] [address] [phone]              Who to contact     Please call your clinic at [phone] to:    Ask questions about your health    Make or cancel appointments    Discuss your medicines    Learn about your test results    Speak to your doctor            Additional Information About Your Visit        Cryptic Software Information     Crypt[employer] Software is an electronic gateway that provides easy, online access to your medical records. With Crypt[employer] Software, you can request a clinic appointment, read your test results, renew a prescription or communicate with your care team.     To sign up for Crypt[employer] Software visit the website  at [email or web address]   You will be asked to enter the access code listed below, as well as some personal information. Please follow the directions to create your username and password.     Your access code is: [email or web address]  Expires: 2018  4:24 PM     Your access code will  in 90 days. If you need help or a new code, please contact your [facility] or call [phone] for assistance.        Care EveryWhere ID     This is your Care EveryWhere ID. This could be used by other organizations to access your [place] medical records  [id number]         Blood Pressure from Last 3 Encounters:   17 124/77   17 115/76   16 133/80    Weight from Last 3 Encounters:   17 106.2 kg (234 lb 3.2 oz) (>99 %)*   17 102.9 kg (226 lb 12.8 oz) (99 %)*   16 100.4 kg (221 lb 6.4 oz) (99 %)*     * Growth percentiles are based on [facility] 2-20 Years data.              Today, you had the following     No orders found for display       Primary Care Provider Office Phone # Fax #    Deb Wood [phone]       [address] [place] [address]        Equal Access to Services     UGO CLEMENS : Hadii aad ku hadasho Soyareli, waaxda luqadaha, qaybta kaalmada maegan, fermin hawk . So [facility] [phone].    ATENCIÓN: Si habla español, tiene a herrera disposición servicios gratuitos de asistencia lingüística. Llame al [phone].    We comply with applicable federal civil rights laws and [place] laws. We do not discriminate on the basis of race, color, national origin, age, disability, sex, sexual orientation, or gender identity.            Thank you!     Thank you for choosing PSYCHIATRY CLINIC  for your care. Our goal is always to provide you with excellent care. Hearing back from our patients is one way we can continue to improve our services. Please take a few minutes to complete the written survey that you may  receive in the mail after your visit with us. Thank you!             Your Updated Medication List - Protect others around you: Learn how to safely use, store and throw away your medicines at www.disposemymeds.org.          This list is accurate as of 5/3/18  6:21 PM.  Always use your most recent med list.                   Brand Name Dispense Instructions for use Diagnosis    acetaminophen 325 MG tablet    TYLENOL    100 tablet    Take 2 tablets (650 mg) by mouth every 4 hours as needed for mild pain        albuterol 108 (90 Base) MCG/ACT Inhaler    PROAIR HFA/PROVENTIL HFA/VENTOLIN HFA    1 Inhaler    Inhale 2 puffs into the lungs every 6 hours as needed for shortness of breath / dyspnea or wheezing (before activity)        cyclobenzaprine 10 MG tablet    FLEXERIL    30 tablet    Take 0.5-1 tablets (5-10 mg) by mouth 3 times daily as needed for muscle spasms    Lumbar sprain, initial encounter       ferrous sulfate 325 (65 Fe) MG tablet    IRON    60 tablet    Take 1 tablet (325 mg) by mouth 2 times daily        ibuprofen 200 MG capsule     60 capsule    Take 200 mg by mouth every 4 hours as needed for fever        levonorgestrel-ethinyl estradiol 0.1-20 MG-MCG per tablet    AVIANE,ALESSE,LESSINA    84 tablet    Take 1 tablet by mouth daily Take at first 1 pill every 6 hours until bleeding slows down.    Encounter for surveillance of contraceptive pills       vitamin D 2000 units tablet     100 tablet    Take 2,000 Units by mouth daily

## 2018-05-08 ENCOUNTER — TELEPHONE (OUTPATIENT)
Dept: NEUROPSYCHOLOGY | Facility: CLINIC | Age: 18
End: 2018-05-08

## 2018-05-08 DIAGNOSIS — D64.9 ANEMIA, UNSPECIFIED TYPE: Primary | ICD-10-CM

## 2018-05-08 RX ORDER — FERROUS SULFATE 325(65) MG
325 TABLET ORAL 2 TIMES DAILY
Qty: 60 TABLET | Refills: 2 | Status: SHIPPED | OUTPATIENT
Start: 2018-05-08 | End: 2018-11-14

## 2018-05-08 NOTE — TELEPHONE ENCOUNTER
Date: 05/08/18    Referral Source: Dr. Ye    Presenting Problem / Reason for Appointment (Clinical History & Symptoms): complex trauma, comprehension issues  Length of time experiencing Symptoms: since age 12    Has patient seen other providers for this/these symptoms: no  M.D. Name / Location:   Therapist Name / Location:   Psychiatrist Name / Location:   Other Name / Location:     Is the presenting concern primarily Behavioral or Medical: behavioral  Medical Diagnosis (if applicable):      Is the child on any Medications: yes  Name of Medication(s): Vitamin D/Ferrous Sulfate/Orsynthia  Prescribing Physician name(s): Dr. aPul and Dr. Wood    Is this a court ordered evaluation: no  Are there currently any legal charges pending: no  Is this a county ordered evaluation: no    Follow up:  Insurance Benefits to be evaluated. Note will be entered when validated.     Does patient wish to be contacted regarding Insurance Benefits: yes    Was full registration verified: yes  If no, why: n/a

## 2018-05-10 ENCOUNTER — BEH TREATMENT PLAN (OUTPATIENT)
Dept: PSYCHIATRY | Facility: CLINIC | Age: 18
End: 2018-05-10

## 2018-05-10 ENCOUNTER — OFFICE VISIT (OUTPATIENT)
Dept: PSYCHIATRY | Facility: CLINIC | Age: 18
End: 2018-05-10
Attending: PSYCHOLOGIST
Payer: COMMERCIAL

## 2018-05-10 DIAGNOSIS — F43.10 POSTTRAUMATIC STRESS DISORDER: Primary | ICD-10-CM

## 2018-05-10 NOTE — MR AVS SNAPSHOT
After Visit Summary   5/10/2018    Lakisha Calixto    MRN: [id number]           Patient Information     Date Of Birth          2000        Visit Information        Provider Department      5/10/2018 3:30 PM Laura Vasquez, PhD Psychiatry Clinic        Today's Diagnoses     Posttraumatic stress disorder    -  1       Follow-ups after your visit        Your next 10 appointments already scheduled     May 17, 2018  3:30 PM CDT   Child Psychotherapy with Laura Vasquez, PhD   Psychiatry Clinic ([facility])    [address] [place] [address]  [address] [address]   [phone]            May 24, 2018  3:30 PM CDT   Child Psychotherapy with Laura Vasquez, PhD   Psychiatry Clinic ([facility])    [address] [place] [address]  [facility] [address]   [phone]            May 31, 2018  3:30 PM CDT   Child Psychotherapy with Laura Vasquez, PhD   Psychiatry Clinic ([facility])    [address]  [facility] [address] [address]   [phone]            Aug 08, 2018  8:45 AM CDT   New Patient Visit with Agatha Craven, PhD [facility] ([facility])    [address] [place] [address]  [address]  [facility] [address] [phone]              Who to contact     Please call your clinic at [phone] to:    Ask questions about your health    Make or cancel appointments    Discuss your medicines    Learn about your test results    Speak to your doctor            Additional Information About Your Visit        Big Live Information     [employer] Live is an electronic gateway that provides easy, online access to your medical records. With Big Live, you can request a clinic appointment, read your test results, renew a prescription or communicate with your care team.     To sign up for Big Live visit the  website at [email or web address]   You will be asked to enter the access code listed below, as well as some personal information. Please follow the directions to create your username and password.     Your access code is: [email or web address]  Expires: 2018  4:24 PM     Your access code will  in 90 days. If you need help or a new code, please contact your [facility] or call [phone] for assistance.        Care EveryWhere ID     This is your Care EveryWhere ID. This could be used by other organizations to access your [place] medical records  [id number]         Blood Pressure from Last 3 Encounters:   17 124/77   17 115/76   16 133/80    Weight from Last 3 Encounters:   17 106.2 kg (234 lb 3.2 oz) (>99 %)*   17 102.9 kg (226 lb 12.8 oz) (99 %)*   16 100.4 kg (221 lb 6.4 oz) (99 %)*     * Growth percentiles are based on [facility] 2-20 Years data.              Today, you had the following     No orders found for display       Primary Care Provider Office Phone # Fax #    Deb Wood [phone]       [address] [place] [address]        Equal Access to Services     [facility] SARATH : Hadii maría edwards hadasho Soyareli, waaxda luqadaha, qaybta kaalmada maegan, fermin hawk . So [facility] [phone].    ATENCIÓN: Si habla español, tiene a herrera disposición servicios gratuitos de asistencia lingüística. Llame al [phone].    We comply with applicable federal civil rights laws and [place] laws. We do not discriminate on the basis of race, color, national origin, age, disability, sex, sexual orientation, or gender identity.            Thank you!     Thank you for choosing PSYCHIATRY CLINIC  for your care. Our goal is always to provide you with excellent care. Hearing back from our patients is one way we can continue to improve our services. Please take a few minutes to complete the written survey that  you may receive in the mail after your visit with us. Thank you!             Your Updated Medication List - Protect others around you: Learn how to safely use, store and throw away your medicines at www.disposemymeds.org.          This list is accurate as of 5/10/18  5:40 PM.  Always use your most recent med list.                   Brand Name Dispense Instructions for use Diagnosis    acetaminophen 325 MG tablet    TYLENOL    100 tablet    Take 2 tablets (650 mg) by mouth every 4 hours as needed for mild pain        albuterol 108 (90 Base) MCG/ACT Inhaler    PROAIR HFA/PROVENTIL HFA/VENTOLIN HFA    1 Inhaler    Inhale 2 puffs into the lungs every 6 hours as needed for shortness of breath / dyspnea or wheezing (before activity)        cyclobenzaprine 10 MG tablet    FLEXERIL    30 tablet    Take 0.5-1 tablets (5-10 mg) by mouth 3 times daily as needed for muscle spasms    Lumbar sprain, initial encounter       ferrous sulfate 325 (65 Fe) MG tablet    IRON    60 tablet    Take 1 tablet (325 mg) by mouth 2 times daily    Anemia, unspecified type       ibuprofen 200 MG capsule     60 capsule    Take 200 mg by mouth every 4 hours as needed for fever        levonorgestrel-ethinyl estradiol 0.1-20 MG-MCG per tablet    AVIANE,ALESSE,LESSINA    84 tablet    Take 1 tablet by mouth daily Take at first 1 pill every 6 hours until bleeding slows down.    Encounter for surveillance of contraceptive pills       vitamin D 2000 units tablet     100 tablet    Take 2,000 Units by mouth daily

## 2018-05-17 ENCOUNTER — OFFICE VISIT (OUTPATIENT)
Dept: PSYCHIATRY | Facility: CLINIC | Age: 18
End: 2018-05-17
Attending: PSYCHOLOGIST
Payer: COMMERCIAL

## 2018-05-17 DIAGNOSIS — F43.10 POSTTRAUMATIC STRESS DISORDER: Primary | ICD-10-CM

## 2018-05-17 NOTE — MR AVS SNAPSHOT
After Visit Summary   5/17/2018    Lakisha Calixto    MRN: [id number]           Patient Information     Date Of Birth          2000        Visit Information        Provider Department      5/17/2018 3:30 PM Laura Vasquez, PhD Psychiatry Clinic        Today's Diagnoses     Posttraumatic stress disorder    -  1       Follow-ups after your visit        Your next 10 appointments already scheduled     May 24, 2018  3:30 PM CDT   Child Psychotherapy with Laura Vsaquez, PhD   Psychiatry Clinic ([facility])    [place] [id number]  [address] [address]   [phone]            May 31, 2018  3:30 PM CDT   Child Psychotherapy with Laura Vasquez, PhD   Psychiatry Clinic ([facility])    [place] [id number]  [facility] [address] [address]   [phone]            Aug 08, 2018  8:45 AM CDT   New Patient Visit with Agatha Craven, PhD [facility] ([facility])    [address] [place] [address]  [address]  [facility] [id number] [phone]              Who to contact     Please call your clinic at [phone] to:    Ask questions about your health    Make or cancel appointments    Discuss your medicines    Learn about your test results    Speak to your doctor            Additional Information About Your Visit        MyChart Information     Nextivat is an electronic gateway that provides easy, online access to your medical records. With dateIITians, you can request a clinic appointment, read your test results, renew a prescription or communicate with your care team.     To sign up for Nextivat visit the website at [email or web address]   You will be asked to enter the access code listed below, as well as some personal information. Please follow the directions to create your username and password.     Your access code is:  [email or web address]  Expires: 2018  4:24 PM     Your access code will  in 90 days. If you need help or a new code, please contact your [facility] or call [phone] for assistance.        Care EveryWhere ID     This is your Care EveryWhere ID. This could be used by other organizations to access your [place] medical records  [id number]         Blood Pressure from Last 3 Encounters:   17 124/77   17 115/76   16 133/80    Weight from Last 3 Encounters:   17 106.2 kg (234 lb 3.2 oz) (>99 %)*   17 102.9 kg (226 lb 12.8 oz) (99 %)*   16 100.4 kg (221 lb 6.4 oz) (99 %)*     * Growth percentiles are based on [facility] 2-20 Years data.              Today, you had the following     No orders found for display       Primary Care Provider Office Phone # Fax #    Deb Wood [phone]       [address] [place] [address]        Equal Access to Services     [facility] AH: Hadii maría edwards hadtoshia Soyareli, waaxda luedy, qaybta kaalmawood issa, fermin hawk . So [facility] [phone].    ATENCIÓN: Si habla español, tiene a herrera disposición servicios gratuitos de asistencia lingüística. Llame al [phone].    We comply with applicable federal civil rights laws and [place] laws. We do not discriminate on the basis of race, color, national origin, age, disability, sex, sexual orientation, or gender identity.            Thank you!     Thank you for choosing PSYCHIATRY CLINIC  for your care. Our goal is always to provide you with excellent care. Hearing back from our patients is one way we can continue to improve our services. Please take a few minutes to complete the written survey that you may receive in the mail after your visit with us. Thank you!             Your Updated Medication List - Protect others around you: Learn how to safely use, store and throw away your medicines at www.disposemymeds.org.           This list is accurate as of 5/17/18 11:59 PM.  Always use your most recent med list.                   Brand Name Dispense Instructions for use Diagnosis    acetaminophen 325 MG tablet    TYLENOL    100 tablet    Take 2 tablets (650 mg) by mouth every 4 hours as needed for mild pain        albuterol 108 (90 Base) MCG/ACT Inhaler    PROAIR HFA/PROVENTIL HFA/VENTOLIN HFA    1 Inhaler    Inhale 2 puffs into the lungs every 6 hours as needed for shortness of breath / dyspnea or wheezing (before activity)        cyclobenzaprine 10 MG tablet    FLEXERIL    30 tablet    Take 0.5-1 tablets (5-10 mg) by mouth 3 times daily as needed for muscle spasms    Lumbar sprain, initial encounter       ferrous sulfate 325 (65 Fe) MG tablet    IRON    60 tablet    Take 1 tablet (325 mg) by mouth 2 times daily    Anemia, unspecified type       ibuprofen 200 MG capsule     60 capsule    Take 200 mg by mouth every 4 hours as needed for fever        levonorgestrel-ethinyl estradiol 0.1-20 MG-MCG per tablet    AVIANE,ALESSE,LESSINA    84 tablet    Take 1 tablet by mouth daily Take at first 1 pill every 6 hours until bleeding slows down.    Encounter for surveillance of contraceptive pills       vitamin D 2000 units tablet     100 tablet    Take 2,000 Units by mouth daily
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
## 2018-05-30 DIAGNOSIS — Z30.41 ENCOUNTER FOR SURVEILLANCE OF CONTRACEPTIVE PILLS: ICD-10-CM

## 2018-05-31 ENCOUNTER — OFFICE VISIT (OUTPATIENT)
Dept: PSYCHIATRY | Facility: CLINIC | Age: 18
End: 2018-05-31
Attending: PSYCHOLOGIST
Payer: COMMERCIAL

## 2018-05-31 DIAGNOSIS — F43.10 POSTTRAUMATIC STRESS DISORDER: Primary | ICD-10-CM

## 2018-05-31 RX ORDER — LEVONORGESTREL/ETHIN.ESTRADIOL 0.1-0.02MG
1 TABLET ORAL DAILY
Qty: 84 TABLET | Refills: 0 | Status: SHIPPED | OUTPATIENT
Start: 2018-05-31 | End: 2018-08-23
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
## 2018-05-31 NOTE — MR AVS SNAPSHOT
After Visit Summary   2018    Lakisha Calixto    MRN: [id number]           Patient Information     Date Of Birth          2000        Visit Information        Provider Department      2018 3:30 PM Laura Vasquez, PhD [facility]        Today's Diagnoses     Posttraumatic stress disorder    -  1       Follow-ups after your visit        Your next 10 appointments already scheduled     Aug 08, 2018  8:45 AM CDT   New Patient Visit with Agatha Craven, PhD [facility] ([facility])    [address] [place] [address]  [address]  [facility] [address] [phone]              Who to contact     Please call your clinic at [phone] to:    Ask questions about your health    Make or cancel appointments    Discuss your medicines    Learn about your test results    Speak to your doctor            Additional Information About Your Visit        MyChart Information     [employer] is an electronic gateway that provides easy, online access to your medical records. With [employer] you can request a clinic appointment, read your test results, renew a prescription or communicate with your care team.     To sign up for [employer] visit the website at [email or web address]   You will be asked to enter the access code listed below, as well as some personal information. Please follow the directions to create your username and password.     Your access code is: [email or web address]  Expires: 2018  4:24 PM     Your access code will  in 90 days. If you need help or a new code, please contact your [facility] or call [phone] for assistance.        Care EveryWhere ID     This is your Care EveryWhere ID. This could be used by other organizations to access your [place] medical records  [id number]         Blood Pressure from Last 3 Encounters:   17 124/77   17 115/76   16 133/80    Weight  from Last 3 Encounters:   12/20/17 106.2 kg (234 lb 3.2 oz) (>99 %)*   04/19/17 102.9 kg (226 lb 12.8 oz) (99 %)*   09/23/16 100.4 kg (221 lb 6.4 oz) (99 %)*     * Growth percentiles are based on [facility] 2-20 Years data.              Today, you had the following     No orders found for display       Primary Care Provider Office Phone # Fax #    Deb Wood [phone]       [address] [place] [address]        Equal Access to Services     [facility]: Hadii maría edwards hadasho Soyareli, waaxda luqadaha, qaybta kaalmada maegan, fermin hawk . So [facility] [phone].    ATENCIÓN: Si habla español, tiene a herrera disposición servicios gratuitos de asistencia lingüística. [facility] [phone].    We comply with applicable federal civil rights laws and [place] laws. We do not discriminate on the basis of race, color, national origin, age, disability, sex, sexual orientation, or gender identity.            Thank you!     Thank you for choosing [facility]  for your care. Our goal is always to provide you with excellent care. Hearing back from our patients is one way we can continue to improve our services. Please take a few minutes to complete the written survey that you may receive in the mail after your visit with us. Thank you!             Your Updated Medication List - Protect others around you: Learn how to safely use, store and throw away your medicines at www.disposemymeds.org.          This list is accurate as of 5/31/18  4:34 PM.  Always use your most recent med list.                   Brand Name Dispense Instructions for use Diagnosis    acetaminophen 325 MG tablet    TYLENOL    100 tablet    Take 2 tablets (650 mg) by mouth every 4 hours as needed for mild pain        albuterol 108 (90 Base) MCG/ACT Inhaler    PROAIR HFA/PROVENTIL HFA/VENTOLIN HFA    1 Inhaler    Inhale 2 puffs into the lungs every 6 hours as needed for shortness of breath / dyspnea or  wheezing (before activity)        cyclobenzaprine 10 MG tablet    FLEXERIL    30 tablet    Take 0.5-1 tablets (5-10 mg) by mouth 3 times daily as needed for muscle spasms    Lumbar sprain, initial encounter       ferrous sulfate 325 (65 Fe) MG tablet    IRON    60 tablet    Take 1 tablet (325 mg) by mouth 2 times daily    Anemia, unspecified type       ibuprofen 200 MG capsule     60 capsule    Take 200 mg by mouth every 4 hours as needed for fever        levonorgestrel-ethinyl estradiol 0.1-20 MG-MCG per tablet    AVIANE,ALESSE,LESSINA    84 tablet    Take 1 tablet by mouth daily Take at first 1 pill every 6 hours until bleeding slows down.    Encounter for surveillance of contraceptive pills       vitamin D 2000 units tablet     100 tablet    Take 2,000 Units by mouth daily

## 2018-06-07 ENCOUNTER — OFFICE VISIT (OUTPATIENT)
Dept: PSYCHIATRY | Facility: CLINIC | Age: 18
End: 2018-06-07
Attending: PSYCHOLOGIST
Payer: COMMERCIAL

## 2018-06-07 DIAGNOSIS — F43.10 POSTTRAUMATIC STRESS DISORDER: Primary | ICD-10-CM
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
## 2018-06-07 NOTE — MR AVS SNAPSHOT
After Visit Summary   6/7/2018    Lakisha Calixto    MRN: [id number]           Patient Information     Date Of Birth          2000        Visit Information        Provider Department      6/7/2018 3:00 PM Laura Vasquez, PhD [facility]ry [facility]        Today's Diagnoses     Posttraumatic stress disorder    -  1       Follow-ups after your visit        Your next 10 appointments already scheduled     Jun 14, 2018  3:00 PM CDT   Child Psychotherapy with Laura Vasquez, PhD   Psychiatry Clinic ([facility])    [address] [place] [address]  [address] [address]   [phone]            Jun 21, 2018  3:00 PM CDT   Child Psychotherapy with Laura Vasquez, PhD   Psychiatry Clinic ([facility])    [address] [place] [address]  [facility] [address] [address]   [phone]            Jun 28, 2018  3:00 PM CDT   Child Psychotherapy with Laura Vasquez, PhD   Psychiatry Clinic ([facility])    [address]  [facility] [address] [address]   [phone]            Aug 08, 2018  8:45 AM CDT   New Patient Visit with Agatha Craven, PhD [facility] ([facility])    [address] [place] [address]  [address]  [facility] [address] [phone]              Who to contact     Please call your clinic at [phone] to:    Ask questions about your health    Make or cancel appointments    Discuss your medicines    Learn about your test results    Speak to your doctor            Additional Information About Your Visit        [employer] Information     [employer] is an electronic gateway that provides easy, online access to your medical records. With [employer], you can request a clinic appointment, read your test results, renew a prescription or communicate with your care team.     To sign up for [employer] visit the  website at [email or web address]   You will be asked to enter the access code listed below, as well as some personal information. Please follow the directions to create your username and password.     Your access code is: [email or web address]  Expires: 2018  4:24 PM     Your access code will  in 90 days. If you need help or a new code, please contact your [facility] or call [phone] for assistance.        Care EveryWhere ID     This is your Care EveryWhere ID. This could be used by other organizations to access your [place] medical records  [id number]         Blood Pressure from Last 3 Encounters:   17 124/77   17 115/76   16 133/80    Weight from Last 3 Encounters:   17 106.2 kg (234 lb 3.2 oz) (>99 %)*   17 102.9 kg (226 lb 12.8 oz) (99 %)*   16 100.4 kg (221 lb 6.4 oz) (99 %)*     * Growth percentiles are based on [facility] 2-20 Years data.              Today, you had the following     No orders found for display       Primary Care Provider Office Phone # Fax #    Deb Wood [phone]       [address] [place] [address]        Equal Access to Services     [facility] : Hadii maría edwards hadasho Soyareli, waaxda luqadaha, qaybta kaalmada maegan, fermin hawk . So [facility] [phone].    ATENCIÓN: Si habla español, tiene a herrera disposición servicios gratuitos de asistencia lingüística. Llame al [phone].    We comply with applicable federal civil rights laws and [place] laws. We do not discriminate on the basis of race, color, national origin, age, disability, sex, sexual orientation, or gender identity.            Thank you!     Thank you for choosing [facility]  for your care. Our goal is always to provide you with excellent care. Hearing back from our patients is one way we can continue to improve our services. Please take a few minutes to complete the written survey that  you may receive in the mail after your visit with us. Thank you!             Your Updated Medication List - Protect others around you: Learn how to safely use, store and throw away your medicines at www.disposemymeds.org.          This list is accurate as of 6/7/18  4:13 PM.  Always use your most recent med list.                   Brand Name Dispense Instructions for use Diagnosis    acetaminophen 325 MG tablet    TYLENOL    100 tablet    Take 2 tablets (650 mg) by mouth every 4 hours as needed for mild pain        albuterol 108 (90 Base) MCG/ACT Inhaler    PROAIR HFA/PROVENTIL HFA/VENTOLIN HFA    1 Inhaler    Inhale 2 puffs into the lungs every 6 hours as needed for shortness of breath / dyspnea or wheezing (before activity)        cyclobenzaprine 10 MG tablet    FLEXERIL    30 tablet    Take 0.5-1 tablets (5-10 mg) by mouth 3 times daily as needed for muscle spasms    Lumbar sprain, initial encounter       ferrous sulfate 325 (65 Fe) MG tablet    IRON    60 tablet    Take 1 tablet (325 mg) by mouth 2 times daily    Anemia, unspecified type       ibuprofen 200 MG capsule     60 capsule    Take 200 mg by mouth every 4 hours as needed for fever        levonorgestrel-ethinyl estradiol 0.1-20 MG-MCG per tablet    AVIANE,ALESSE,LESSINA    84 tablet    Take 1 tablet by mouth daily Take at first 1 pill every 6 hours until bleeding slows down.    Encounter for surveillance of contraceptive pills       vitamin D 2000 units tablet     100 tablet    Take 2,000 Units by mouth daily

## 2018-06-14 ENCOUNTER — OFFICE VISIT (OUTPATIENT)
Dept: PSYCHIATRY | Facility: CLINIC | Age: 18
End: 2018-06-14
Attending: PSYCHOLOGIST
Payer: COMMERCIAL

## 2018-06-14 DIAGNOSIS — F43.10 POSTTRAUMATIC STRESS DISORDER: Primary | ICD-10-CM
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
## 2018-06-28 ENCOUNTER — OFFICE VISIT (OUTPATIENT)
Dept: PSYCHIATRY | Facility: CLINIC | Age: 18
End: 2018-06-28
Attending: PSYCHOLOGIST
Payer: COMMERCIAL

## 2018-06-28 DIAGNOSIS — F43.10 POSTTRAUMATIC STRESS DISORDER: Primary | ICD-10-CM
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
## 2018-06-28 NOTE — MR AVS SNAPSHOT
After Visit Summary   6/28/2018    Lakisha Calixto    MRN: [id number]           Patient Information     Date Of Birth          2000        Visit Information        Provider Department      6/28/2018 3:00 PM Laura Vasquez, PhD Psychiatry Clinic        Today's Diagnoses     Posttraumatic stress disorder    -  1       Follow-ups after your visit        Your next 10 appointments already scheduled     Jul 05, 2018  3:00 PM CDT   Child Psychotherapy with Laura Vasquez, PhD   Psychiatry Clinic ([facility])    [address] [address]  [address] [address]   [phone]            Jul 12, 2018  3:00 PM CDT   Child Psychotherapy with Laura Vasquez, PhD   Psychiatry Clinic ([facility])    [address] [address]  [address] [address]   [phone]            Jul 19, 2018  3:00 PM CDT   Child Psychotherapy with Laura Vasquez, PhD   Psychiatry Clinic ([facility])    [address] [address]  [address] [address]   [phone]            Jul 26, 2018  3:00 PM CDT   Child Psychotherapy with Laura Vasquez, PhD   Psychiatry Clinic ([facility])    [address] [place] [address]  [address] [address]   [phone]            Aug 08, 2018  8:45 AM CDT   New Patient Visit with Agatha Craven, PhD [facility] ([facility])    [address] [place] [address]  [address]  [facility] [address] [phone]              Who to contact     Please call your clinic at [phone] to:    Ask questions about your health    Make or cancel appointments    Discuss your medicines    Learn about your test results    Speak to your doctor            Additional Information About Your Visit        MyChart Information     MyChart is an electronic  gateway that provides easy, online access to your medical records. With [employer], you can request a clinic appointment, read your test results, renew a prescription or communicate with your care team.     To sign up for [employer] visit the website at [email or web address]   You will be asked to enter the access code listed below, as well as some personal information. Please follow the directions to create your username and password.     Your access code is: [email or web address]  Expires: 2018  4:24 PM     Your access code will  in 90 days. If you need help or a new code, please contact your [facility] or call [phone] for assistance.        Care EveryWhere ID     This is your Care EveryWhere ID. This could be used by other organizations to access your [facility] medical records  [id number]         Blood Pressure from Last 3 Encounters:   17 124/77   17 115/76   16 133/80    Weight from Last 3 Encounters:   17 106.2 kg (234 lb 3.2 oz) (>99 %)*   17 102.9 kg (226 lb 12.8 oz) (99 %)*   16 100.4 kg (221 lb 6.4 oz) (99 %)*     * Growth percentiles are based on [facility] 2-20 Years data.              Today, you had the following     No orders found for display       Primary Care Provider Office Phone # Fax #    Deb SHA Wood [phone]       [address] [place] [address]        Equal Access to Services     [facility] : Hadii maría edwards hadasho Soomaali, waaxda luqadaha, qaybta kaalmada adeegyawood, fermin lanza. So [facility] [phone].    ATENCIÓN: Si habla español, tiene a herrera disposición servicios gratuitos de asistencia lingüística. Llame al [phone].    We comply with applicable federal civil rights laws and [place] laws. We do not discriminate on the basis of race, color, national origin, age, disability, sex, sexual orientation, or gender identity.            Thank you!     Thank you for  choosing PSYCHIATRY CLINIC  for your care. Our goal is always to provide you with excellent care. Hearing back from our patients is one way we can continue to improve our services. Please take a few minutes to complete the written survey that you may receive in the mail after your visit with us. Thank you!             Your Updated Medication List - Protect others around you: Learn how to safely use, store and throw away your medicines at www.disposemymeds.org.          This list is accurate as of 6/28/18  4:06 PM.  Always use your most recent med list.                   Brand Name Dispense Instructions for use Diagnosis    acetaminophen 325 MG tablet    TYLENOL    100 tablet    Take 2 tablets (650 mg) by mouth every 4 hours as needed for mild pain        albuterol 108 (90 Base) MCG/ACT Inhaler    PROAIR HFA/PROVENTIL HFA/VENTOLIN HFA    1 Inhaler    Inhale 2 puffs into the lungs every 6 hours as needed for shortness of breath / dyspnea or wheezing (before activity)        cyclobenzaprine 10 MG tablet    FLEXERIL    30 tablet    Take 0.5-1 tablets (5-10 mg) by mouth 3 times daily as needed for muscle spasms    Lumbar sprain, initial encounter       ferrous sulfate 325 (65 Fe) MG tablet    IRON    60 tablet    Take 1 tablet (325 mg) by mouth 2 times daily    Anemia, unspecified type       ibuprofen 200 MG capsule     60 capsule    Take 200 mg by mouth every 4 hours as needed for fever        levonorgestrel-ethinyl estradiol 0.1-20 MG-MCG per tablet    AVIANE,ALESSE,LESSINA    84 tablet    Take 1 tablet by mouth daily Take at first 1 pill every 6 hours until bleeding slows down.    Encounter for surveillance of contraceptive pills       vitamin D 2000 units tablet     100 tablet    Take 2,000 Units by mouth daily

## 2018-07-05 ENCOUNTER — OFFICE VISIT (OUTPATIENT)
Dept: PSYCHIATRY | Facility: CLINIC | Age: 18
End: 2018-07-05
Attending: PSYCHOLOGIST
Payer: COMMERCIAL

## 2018-07-05 DIAGNOSIS — F43.10 POSTTRAUMATIC STRESS DISORDER: Primary | ICD-10-CM

## 2018-07-05 NOTE — MR AVS SNAPSHOT
After Visit Summary   7/5/2018    Lakisha Calixto    MRN: [id number]           Patient Information     Date Of Birth          2000        Visit Information        Provider Department      7/5/2018 3:00 PM Laura Vasquez, PhD Psychiatry Clinic        Today's Diagnoses     Posttraumatic stress disorder    -  1       Follow-ups after your visit        Your next 10 appointments already scheduled     Jul 12, 2018  3:00 PM CDT   Child Psychotherapy with Laura Vasquez, PhD   Psychiatry Clinic ([facility])    [address]  [address] [address]   [phone]            Jul 19, 2018  3:00 PM CDT   Child Psychotherapy with Laura Vasquez, PhD   Psychiatry Clinic ([facility])    [address]  [facility] [address]   [phone]            Jul 26, 2018  3:00 PM CDT   Child Psychotherapy with Laura Vasquez, PhD   Psychiatry Clinic ([facility])    [address]  [facility] [address] [address]   [phone]            Aug 08, 2018  8:45 AM CDT   New Patient Visit with Agatha Craven, PhD [facility]    [address] [place] [address]  [address]  [facility] [address] [phone]              Who to contact     Please call your clinic at [phone] to:    Ask questions about your health    Make or cancel appointments    Discuss your medicines    Learn about your test results    Speak to your doctor            Additional Information About Your Visit        twiDAQ Information     twiDAQ is an electronic gateway that provides easy, online access to your medical records. With twiDAQ, you can request a clinic appointment, read your test results, renew a prescription or communicate with your care team.     To sign up for twiDAQ visit the  website at [email or web address]   You will be asked to enter the access code listed below, as well as some personal information. Please follow the directions to create your username and password.     Your access code is: [email or web address]  Expires: 2018  4:24 PM     Your access code will  in 90 days. If you need help or a new code, please contact your [facility] or call [phone] for assistance.        Care EveryWhere ID     This is your Care EveryWhere ID. This could be used by other organizations to access your [facility] medical records  [id number]         Blood Pressure from Last 3 Encounters:   17 124/77   17 115/76   16 133/80    Weight from Last 3 Encounters:   17 106.2 kg (234 lb 3.2 oz) (>99 %)*   17 102.9 kg (226 lb 12.8 oz) (99 %)*   16 100.4 kg (221 lb 6.4 oz) (99 %)*     * Growth percentiles are based on [facility] 2-20 Years data.              Today, you had the following     No orders found for display       Primary Care Provider Office Phone # Fax #    Deb Wood [phone]       [address] [place] [address]        Equal Access to Services     [facility] SARATH : Hadii maría edwadrs hadasho Soyareli, waaxda luqadaha, qaybta kaalmada maegan, fermin hawk . So [facility] [phone].    ATENCIÓN: Si habla español, tiene a herrera disposición servicios gratuitos de asistencia lingüística. Llame al [phone].    We comply with applicable federal civil rights laws and [place] laws. We do not discriminate on the basis of race, color, national origin, age, disability, sex, sexual orientation, or gender identity.            Thank you!     Thank you for choosing PSY[facility]IATRY CLINIC  for your care. Our goal is always to provide you with excellent care. Hearing back from our patients is one way we can continue to improve our services. Please take a few minutes to complete the written survey that  you may receive in the mail after your visit with us. Thank you!             Your Updated Medication List - Protect others around you: Learn how to safely use, store and throw away your medicines at www.disposemymeds.org.          This list is accurate as of 7/5/18  4:13 PM.  Always use your most recent med list.                   Brand Name Dispense Instructions for use Diagnosis    acetaminophen 325 MG tablet    TYLENOL    100 tablet    Take 2 tablets (650 mg) by mouth every 4 hours as needed for mild pain        albuterol 108 (90 Base) MCG/ACT Inhaler    PROAIR HFA/PROVENTIL HFA/VENTOLIN HFA    1 Inhaler    Inhale 2 puffs into the lungs every 6 hours as needed for shortness of breath / dyspnea or wheezing (before activity)        cyclobenzaprine 10 MG tablet    FLEXERIL    30 tablet    Take 0.5-1 tablets (5-10 mg) by mouth 3 times daily as needed for muscle spasms    Lumbar sprain, initial encounter       ferrous sulfate 325 (65 Fe) MG tablet    IRON    60 tablet    Take 1 tablet (325 mg) by mouth 2 times daily    Anemia, unspecified type       ibuprofen 200 MG capsule     60 capsule    Take 200 mg by mouth every 4 hours as needed for fever        levonorgestrel-ethinyl estradiol 0.1-20 MG-MCG per tablet    AVIANE,ALESSE,LESSINA    84 tablet    Take 1 tablet by mouth daily Take at first 1 pill every 6 hours until bleeding slows down.    Encounter for surveillance of contraceptive pills       vitamin D 2000 units tablet     100 tablet    Take 2,000 Units by mouth daily
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
## 2018-07-26 ENCOUNTER — OFFICE VISIT (OUTPATIENT)
Dept: PSYCHIATRY | Facility: CLINIC | Age: 18
End: 2018-07-26
Attending: PSYCHOLOGIST
Payer: COMMERCIAL

## 2018-07-26 DIAGNOSIS — F43.10 POSTTRAUMATIC STRESS DISORDER: Primary | ICD-10-CM
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
## 2018-07-26 NOTE — MR AVS SNAPSHOT
After Visit Summary   7/26/2018    Lakisha Calixto    MRN: [id number]           Patient Information     Date Of Birth          2000        Visit Information        Provider Department      7/26/2018 3:00 PM Laura Vasquez, PhD Psychiatry Clinic        Today's Diagnoses     Posttraumatic stress disorder    -  1       Follow-ups after your visit        Your next 10 appointments already scheduled     Aug 02, 2018  3:00 PM CDT   Child Psychotherapy with Laura Vasquez, PhD   Psychiatry Clinic ([facility])    [facility]  [address]  [address] [address] [phone]            Aug 08, 2018  8:45 AM CDT   New Patient Visit with Agatha Craven, PhD [facility] ([facility])    [address] [place] [address]   [phone]            Aug 09, 2018  3:00 PM CDT   Child Psychotherapy with Laura Vasquez, PhD   Psychiatry Clinic ([facility])    [facility]  [address]  [facility] [address] [address] [phone]            Aug 16, 2018  3:00 PM CDT   Child Psychotherapy with Laura Vasquez, PhD   Psychiatry Clinic ([facility])    [facility]  [address]  [address] [address] [phone]            Aug 23, 2018  8:45 AM CDT   Return Visit with Agatha Craven, PhD    Peds Neuropsychology ([facility])    [facility]  [address]  [address] [place] [address] [phone]            Aug 23, 2018  3:00 PM CDT   Child Psychotherapy with Laura Vasquez, PhD   Psychiatry Clinic ([facility])    [facility]  [address]  [address] [address] [phone]              Who to contact     Please call your clinic at [phone] to:    Ask questions about  your health    Make or cancel appointments    Discuss your medicines    Learn about your test results    Speak to your doctor            Additional Information About Your Visit        MyChart Information     [employer] is an electronic gateway that provides easy, online access to your medical records. With [email or web address], you can request a clinic appointment, read your test results, renew a prescription or communicate with your care team.     To sign up for [email or web address] visit the website at [email or web address] by [employer]   You will be asked to enter the access code listed below, as well as some personal information. Please follow the directions to create your username and password.     Your access code is: [email or web address]  Expires: 10/10/2018  3:29 PM     Your access code will  in 90 days. If you need help or a new code, please contact your [facility] or call [phone] for assistance.        Care EveryWhere ID     This is your Care EveryWhere ID. This could be used by other organizations to access your [facility] medical records  [id number]         Blood Pressure from Last 3 Encounters:   17 124/77   17 115/76   16 133/80    Weight from Last 3 Encounters:   17 106.2 kg (234 lb 3.2 oz) (>99 %)*   17 102.9 kg (226 lb 12.8 oz) (99 %)*   16 100.4 kg (221 lb 6.4 oz) (99 %)*     * Growth percentiles are based on [facility] 2-20 Years data.              Today, you had the following     No orders found for display       Primary Care Provider Office Phone # Fax #    Deb Wood [phone]       [address] [place] [address]        Equal Access to Services     [facility] : Hadii aad ku hadasho Soomaali, waaxda luqadaha, qaybta kaalmada adeegyada, fermin hawk . So [facility] [phone].    ATENCIÓN: Si habla español, tiene a herrera disposición servicios gratuitos de asistencia lingüística. Llame al [phone].    We comply with  applicable federal civil rights laws and [place] laws. We do not discriminate on the basis of race, color, national origin, age, disability, sex, sexual orientation, or gender identity.            Thank you!     Thank you for choosing [facility]RY [facility]INIC  for your care. Our goal is always to provide you with excellent care. Hearing back from our patients is one way we can continue to improve our services. Please take a few minutes to complete the written survey that you may receive in the mail after your visit with us. Thank you!             Your Updated Medication List - Protect others around you: Learn how to safely use, store and throw away your medicines at www.disposemymeds.org.          This list is accurate as of 7/26/18  4:12 PM.  Always use your most recent med list.                   Brand Name Dispense Instructions for use Diagnosis    acetaminophen 325 MG tablet    TYLENOL    100 tablet    Take 2 tablets (650 mg) by mouth every 4 hours as needed for mild pain        albuterol 108 (90 Base) MCG/ACT Inhaler    PROAIR HFA/PROVENTIL HFA/VENTOLIN HFA    1 Inhaler    Inhale 2 puffs into the lungs every 6 hours as needed for shortness of breath / dyspnea or wheezing (before activity)        cyclobenzaprine 10 MG tablet    FLEXERIL    30 tablet    Take 0.5-1 tablets (5-10 mg) by mouth 3 times daily as needed for muscle spasms    Lumbar sprain, initial encounter       ferrous sulfate 325 (65 Fe) MG tablet    IRON    60 tablet    Take 1 tablet (325 mg) by mouth 2 times daily    Anemia, unspecified type       ibuprofen 200 MG capsule     60 capsule    Take 200 mg by mouth every 4 hours as needed for fever        levonorgestrel-ethinyl estradiol 0.1-20 MG-MCG per tablet    AVIANE,ALESSE,LESSINA    84 tablet    Take 1 tablet by mouth daily Take at first 1 pill every 6 hours until bleeding slows down.    Encounter for surveillance of contraceptive pills       vitamin D 2000 units tablet     100 tablet    Take 2,000  Units by mouth daily

## 2018-08-23 ENCOUNTER — OFFICE VISIT (OUTPATIENT)
Dept: NEUROPSYCHOLOGY | Facility: CLINIC | Age: 18
End: 2018-08-23
Attending: PSYCHOLOGIST
Payer: COMMERCIAL

## 2018-08-23 DIAGNOSIS — Z04.9 OBSERVATION FOR SUSPECTED CONDITION: Primary | ICD-10-CM

## 2018-08-23 DIAGNOSIS — Z30.41 ENCOUNTER FOR SURVEILLANCE OF CONTRACEPTIVE PILLS: ICD-10-CM

## 2018-08-23 RX ORDER — LEVONORGESTREL/ETHIN.ESTRADIOL 0.1-0.02MG
1 TABLET ORAL DAILY
Qty: 84 TABLET | Refills: 0 | Status: SHIPPED | OUTPATIENT
Start: 2018-08-23 | End: 2018-11-14

## 2018-08-23 NOTE — LETTER
Date:August 28, 2018      Provider requested that no letter be sent. Do not send.       [facility] Information
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
## 2018-08-23 NOTE — MR AVS SNAPSHOT
After Visit Summary   2018    Rachel Calixto    MRN: [id number]           Patient Information     Date Of Birth          2000        Visit Information        Provider Department      2018 8:45 AM Agatha Craven, PhD [facility] Peds Neuropsychology        Today's Diagnoses     Observation for suspected condition    -  1       Follow-ups after your visit        Your next 10 appointments already scheduled     Oct 26, 2018  8:45 AM CDT   Return Visit with Agatha Craven, PhD [facility]   Peds Neuropsychology ([facility])    [facility]  [address]  [address] [facility] [id number] [phone]              Who to contact     Please call your clinic at [phone] to:    Ask questions about your health    Make or cancel appointments    Discuss your medicines    Learn about your test results    Speak to your doctor            Additional Information About Your Visit        MyChart Information     icomasoftt is an electronic gateway that provides easy, online access to your medical records. With Site9, you can request a clinic appointment, read your test results, renew a prescription or communicate with your care team.     To sign up for icomasoftt visit the website at [email or web address]   You will be asked to enter the access code listed below, as well as some personal information. Please follow the directions to create your username and password.     Your access code is: [email or web address]  Expires: 10/10/2018  3:29 PM     Your access code will  in 90 days. If you need help or a new code, please contact your [facility] or call [phone] for assistance.        Care EveryWhere ID     This is your Care EveryWhere ID. This could be used by other organizations to access your [facility] medical records  [id number]         Blood Pressure from Last 3 Encounters:   17 124/77   17 115/76   16 133/80     Weight from Last 3 Encounters:   12/20/17 106.2 kg (234 lb 3.2 oz) (>99 %)*   04/19/17 102.9 kg (226 lb 12.8 oz) (99 %)*   09/23/16 100.4 kg (221 lb 6.4 oz) (99 %)*     * Growth percentiles are based on [facility] 2-20 Years data.              We Performed the Following     [id number] TESTING, PER HR/PSYCHOLOGIST     NEUROPSYCH TESTING BY TECH          Where to get your medicines      These medications were sent to [facility] - [place], [place] - [address], [place] [address]     Phone:  [phone]     levonorgestrel-ethinyl estradiol 0.1-20 MG-MCG per tablet          Primary Care Provider Office Phone # Fax #    Deb Wood [phone]       [address] [place] [address]        Equal Access to Services     BARBRA CLEMENS : Hadii maría mooreo Soyareli, waaxda luedy, qaybta kaalmada maegan, fermin hawk . So [facility] [phone].    ATENCIÓN: Si habla español, tiene a herrera disposición servicios gratuitos de asistencia lingüística. Tri al [phone].    We comply with applicable federal civil rights laws and [place] laws. We do not discriminate on the basis of race, color, national origin, age, disability, sex, sexual orientation, or gender identity.            Thank you!     Thank you for choosing PEDS NEUROPSYCHOLOGY  for your care. Our goal is always to provide you with excellent care. Hearing back from our patients is one way we can continue to improve our services. Please take a few minutes to complete the written survey that you may receive in the mail after your visit with us. Thank you!             Your Updated Medication List - Protect others around you: Learn how to safely use, store and throw away your medicines at www.disposemymeds.org.          This list is accurate as of 8/23/18 11:59 PM.  Always use your most recent med list.                   Brand Name Dispense Instructions for use Diagnosis     acetaminophen 325 MG tablet    TYLENOL    100 tablet    Take 2 tablets (650 mg) by mouth every 4 hours as needed for mild pain        albuterol 108 (90 Base) MCG/ACT inhaler    PROAIR HFA/PROVENTIL HFA/VENTOLIN HFA    1 Inhaler    Inhale 2 puffs into the lungs every 6 hours as needed for shortness of breath / dyspnea or wheezing (before activity)        cyclobenzaprine 10 MG tablet    FLEXERIL    30 tablet    Take 0.5-1 tablets (5-10 mg) by mouth 3 times daily as needed for muscle spasms    Lumbar sprain, initial encounter       ferrous sulfate 325 (65 Fe) MG tablet    IRON    60 tablet    Take 1 tablet (325 mg) by mouth 2 times daily    Anemia, unspecified type       ibuprofen 200 MG capsule     60 capsule    Take 200 mg by mouth every 4 hours as needed for fever        levonorgestrel-ethinyl estradiol 0.1-20 MG-MCG per tablet    AVIANE,ALESSE,LESSINA    84 tablet    Take 1 tablet by mouth daily Take at first 1 pill every 6 hours until bleeding slows down.    Encounter for surveillance of contraceptive pills       vitamin D 2000 units tablet     100 tablet    Take 2,000 Units by mouth daily
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
## 2018-09-12 ENCOUNTER — OFFICE VISIT (OUTPATIENT)
Dept: NEUROPSYCHOLOGY | Facility: CLINIC | Age: 18
End: 2018-09-12
Payer: COMMERCIAL

## 2018-09-12 DIAGNOSIS — F43.10 POSTTRAUMATIC STRESS DISORDER: ICD-10-CM

## 2018-09-12 DIAGNOSIS — G93.40 ENCEPHALOPATHY: Primary | ICD-10-CM

## 2018-09-12 DIAGNOSIS — F70 MILD INTELLECTUAL DISABILITY: ICD-10-CM

## 2018-09-12 NOTE — MR AVS SNAPSHOT
After Visit Summary   2018    Rachel Calixto    MRN: [id number]           Patient Information     Date Of Birth          2000        Visit Information        Provider Department      2018 8:45 AM Agatha Craven, PhD [facility]        Today's Diagnoses     Encephalopathy    -  1    Mild intellectual disability        Posttraumatic stress disorder           Follow-ups after your visit        Your next 10 appointments already scheduled     Oct 11, 2018  2:00 PM CDT   Child Psychotherapy with Laura Vasquez, PhD   Psychiatry Clinic ([facility])    [place] [id number]  [facility] [address] [id number] [phone]            Oct 25, 2018  2:00 PM CDT   Child Psychotherapy with Laura Vasquez, PhD   Psychiatry Clinic ([facility])    [place] [id number]  [facility] [address] [id number] [phone]              Who to contact     Please call your clinic at [phone] to:    Ask questions about your health    Make or cancel appointments    Discuss your medicines    Learn about your test results    Speak to your doctor            Additional Information About Your Visit        MyChart Information     [employer] is an electronic gateway that provides easy, online access to your medical records. With [employer], you can request a clinic appointment, read your test results, renew a prescription or communicate with your care team.     To sign up for Utterzt visit the website at [email or web address]   You will be asked to enter the access code listed below, as well as some personal information. Please follow the directions to create your username and password.     Your access code is: [email or web address]  Expires: 2019  2:30 AM     Your access code will  in 90 days. If you need help or a new code, please contact your [facility]  [facility] [facility] or call [phone] for assistance.        Care EveryWhere ID     This is your Care EveryWhere ID. This could be used by other organizations to access your [facility] medical records  [id number]         Blood Pressure from Last 3 Encounters:   12/20/17 124/77   04/19/17 115/76   09/23/16 133/80    Weight from Last 3 Encounters:   12/20/17 106.2 kg (234 lb 3.2 oz) (>99 %)*   04/19/17 102.9 kg (226 lb 12.8 oz) (99 %)*   09/23/16 100.4 kg (221 lb 6.4 oz) (99 %)*     * Growth percentiles are based on [facility] 2-20 Years data.              We Performed the Following     [id number] TESTING, PER HR/PSYCHOLOGIST     NEUROPSYCH TESTING BY [facility]        Primary Care Provider Office Phone # Fax #    Deb Wood [phone]       [address] [place] [address]        Equal Access to Services     UGO CLEMENS : Hadii aad ku hadasho Soyareli, waaxda luqadaha, qaybta kaalmada adeegyada, fermin hawk . So [facility] [phone].    ATENCIÓN: Si habla español, tiene a herrera disposición servicios gratuitos de asistencia lingüística. Llame al [phone].    We comply with applicable federal civil rights laws and [place] laws. We do not discriminate on the basis of race, color, national origin, age, disability, sex, sexual orientation, or gender identity.            Thank you!     Thank you for choosing [facility]  for your care. Our goal is always to provide you with excellent care. Hearing back from our patients is one way we can continue to improve our services. Please take a few minutes to complete the written survey that you may receive in the mail after your visit with us. Thank you!             Your Updated Medication List - Protect others around you: Learn how to safely use, store and throw away your medicines at www.disposemymeds.org.          This list is accurate as of 9/12/18 11:59 PM.  Always use your most  recent med list.                   Brand Name Dispense Instructions for use Diagnosis    acetaminophen 325 MG tablet    TYLENOL    100 tablet    Take 2 tablets (650 mg) by mouth every 4 hours as needed for mild pain        albuterol 108 (90 Base) MCG/ACT inhaler    PROAIR HFA/PROVENTIL HFA/VENTOLIN HFA    1 Inhaler    Inhale 2 puffs into the lungs every 6 hours as needed for shortness of breath / dyspnea or wheezing (before activity)        cyclobenzaprine 10 MG tablet    FLEXERIL    30 tablet    Take 0.5-1 tablets (5-10 mg) by mouth 3 times daily as needed for muscle spasms    Lumbar sprain, initial encounter       ferrous sulfate 325 (65 Fe) MG tablet    IRON    60 tablet    Take 1 tablet (325 mg) by mouth 2 times daily    Anemia, unspecified type       ibuprofen 200 MG capsule     60 capsule    Take 200 mg by mouth every 4 hours as needed for fever        levonorgestrel-ethinyl estradiol 0.1-20 MG-MCG per tablet    AVIANE,ALESSE,LESSINA    84 tablet    Take 1 tablet by mouth daily Take at first 1 pill every 6 hours until bleeding slows down.    Encounter for surveillance of contraceptive pills       vitamin D 2000 units tablet     100 tablet    Take 2,000 Units by mouth daily

## 2018-09-12 NOTE — LETTER
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 exposed to significant stressors early in life are characterized by differences in brain development that are associated with increased attentional, learning, behavioral, and emotional difficulties, some of which are demonstrated by Rachel Beth. As such, Rachel Beth s current difficulties should be conceptualized as the result of the cumulative effects of her early history and the subsequent neurodevelopmental (i.e. brain-based) changes. As such, a diagnosis of Static Encephalopathy is appropriate to reflect Rachel Beth s differential brain development in response to early exposure to significant and chronic stress.     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 We strongly recommend that Rachel Beth continue to participate in therapy targeting her trauma symptoms, particularly as she describes therapy as helpful. It will be important for Rachel Beth s therapist to consider the current findings, and Rachel Beth s level in functioning in identifying therapeutic strategies, and adapt strategies accordingly. Specifically, Rachel Beth will likely require frequent repetition when learning new therapeutic strategies, and would benefit from support (e.g., from her mother) in implementing therapy strategies in daily life. Rachel Beth will also likely benefit from action oriented therapy strategies (e.g., role play). It should be noted that Rachel Beth may not verbalize when she does not understand. She displayed a tendency to  go along  or agree with the examiner, which when further pressed highlighted her misunderstanding. We  recommend that Rachel Beth s therapist frequently check-in with her or ask follow-up questions to assess her comprehension of information.   2. Given Rachel Beth s identified cognitive challenges and diagnosis of Intellectual Disability, she should be considered for disability services/social security support.   3. Rachel Beth will face challenges in her work related to cognitive and adaptive limitations. Given reasoning, processing and working memory weaknesses, Rachel Beth is likely to struggle in jobs that are fast paced or require quick decision making. As such, Rachel Beth is encouraged to consider these factors in pursuing future employment.   4. We encourage Rachel Beth to seek out opportunities to develop job skills over the next year, prior to returning to school. 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 We encourage Rachel Beth to pursue job training/job skills through [employer] ([employer]). The following are VRS offices in your area:   a. [facility]  [address], [place] [address]  b. [facility] Office  [address], [place] [address]  c. [facility] Office  [facility], [address], [place] [address]     Academic  1. When returning to school, we recommend that Rachel Beth share the results of this outside evaluation, as well as previous school records (e.g., school evaluation reports, IEPs) with her school office of disabilities and request (in writing) that she be considered for academic accommodations through a 504 Plan. The following recommendations should be considered:  a. Rachel Beth will have difficulty remembering information that has only been presented a single time and will likely benefit from repeated presentation of newly learned information, as well as being evaluated in a cued format, such as multiple-choice, with limited response options  "(e.g., \"yes\" or \"no\"). Multi-modal presentation of information that includes modeling and demonstration, whenever possible, will likely improve her overall understanding of what is being asked of her. Verbal material could be presented in smaller, less complex, segments and nonverbal material could be simplified to avoid overwhelming her. Rachel Beth learns best when what she is required to learn is brief and concise and presented in combination with simple visual stimuli. In addition, Rachel Beth benefits from repetition. She should practice learning information over a number of days and will benefit from repeated presentation of newly learned information.    b. Directions should be clear, concise, and amenable to demonstration and subsequent practice. Complex instructions should be simplified and multiple step commands should be broken into step-by-step processes.  c. Extended time on assignments and tests should be provided along with allowing the opportunity to complete tasks in a relatively distraction free environment.   d. Supports for Rachel Beth s executive functioning are recommended. Examples include support in: completing an assignment notebook, packing the proper homework materials in her bag, and remembering to hand in completed work.  e. Rachel Beth will need simplification of novel information to promote new learning.  Rachel Beth would also benefit from a preparatory set for learning. Establishing set would include:  i. Clearly explaining the learning objective  ii. Introducing new vocabulary  iii. Providing outlines; and   iv. Relating previously learned material and information to each other.  f. Rachel Beth will benefit from using a  backward chaining  approach to learning new tasks or routines. To implement this strategy, a given task should be broken down into a series of steps with parent or teacher first performing all of the steps and Rachel Guevaraa performing the last. Once successful, Rachel Beth would then proceed to learn " the steps in backwards order until she can perform the whole sequence. This strategy is particularly helpful because it provides more exposure to where the steps of a given task lead and allows the learner to always finish the task and experience success, thus building self-esteem.     g. Given evidence of fine motor weaknesses, the following accommodations are recommedned:  i. Reduction in time constraints especially for assignments that involve writing. Classroom assignments, particularly those that are written could be shortened, or more time could be allocated for their completion.   ii. Rachel Beth would also benefit from reductions in notetaking demands, either by providing her with an outline to take notes on, or being provided with a copy of another peers  notes. This will allow her to listen and learn without having to manage and shift between writing demands.   Home  1. Rachel Beth s mother expressed concerns regarding planning for her future, and her level of independence. In addition to further schooling and job training opportunities, it will be important to work with expert advocates to consider long-term financial planning; health care and health insurance options; options for exercise, social and leisure recreation; support for learning and maintaining stronger skills in areas of daily living, such as self-care, domestic chores, and safety awareness. Consideration of guardianship or other forms of financial and medical decision making support is warranted. This is a lot to manage and it doesn t need to be  all at once,  but all will be issues to consider and address over the next few years, little by little.   2. There are a number of Apps for smartphones and tablets that Rachel Beth may find enjoyable, but also may help her learn skills and real-life routines.  For example:  a. Functional Planning System  b. VendRxse- Allows to track movements through phone in real time  c. CanPlan and Choice Works- Learning to  complete daily routines  d. Category theAPPy lite- learning to categorize abstract information  e. My PlayHome and PlayLegCytee Store- Teaches home living and money management  f. Ethel García- Teaches organization and direction following  g. Visual Currency Calculator  3. There are a number of agencies that support children and adults with developmental disabilities. For example:   a. ARC: http://www.thearc.org/ is the national ARC website.  Each state has a branch, many of which are very active.  b. State Resources List from [facility]:  http://www.nichcy.org/stateshe/      Includes names, addresses, phones and email for state developmental disabilities, special education, vocational rehabilitation, assistive technology, and parent advocacy programs, listed for every state.  c. The [employer] center in [place], [place] (www.pacer.org ) is a [facility] center for providing information, advocacy, and training for parents and professionals, about children's rights within the educational system.  They have produced nationally recognized publications and workshops on these issues.        We hope that our evaluation of Rachel Beth assists you with the planning of her treatment. If you have any questions or comments please feel free to contact us at [phone].    Megan Miles, Ph.D.  Post-Doctoral Fellow  Department of Pediatrics  Division of Clinical Behavioral Neuroscience     Eleazar MENDOZA  Psychometrist  Pediatric Neuropsychology   [facility]    Agatha Craven, Ph.D., L.P., [facility]  Professor of Pediatrics  , Division of Clinical Behavioral Neuroscience     Time Spent: 3 hours professional time, including interview, record review, data integration, and report editing by a neuropsychologist ([id number]);  5 hours of testing administered by a trainee and interpreted by a neuropsychologist, and report writing by a trainee and edited by a neuropsychologist ([id number]).    Previously Billed: 1  hour professional time, including interview, record review, data integration, and report writing ([id number]); 5 hours psychometrist testing and documentation under supervision of a neuropsychologist ([id number]).        PEDIATRIC NEUROPSYCHOLOGY CLINIC  CONFIDENTIAL TEST SCORES    Note: These scores are intended for appropriately licensed professionals and should never be interpreted without consideration of the attached narrative report.    Test Results:   Note: The test data listed below use one or more of the following formats:   *Standard Scores have an average of 100 and a standard deviation of 15 (the average range is 85 to 115).   *Scaled Scores have an average of 10 and a standard deviation of 3 (the average range is 7 to 13).   *T-Scores have an average range of 50 and a standard deviation of 10 (the average range is 40 to 60).   *Z-Scores have an average of 0 and a standard deviation of 1 (the average range is -1 to 1).     COGNITIVE FUNCTIONING  Wechsler Adult Intelligence Scale, Fourth Edition   Standard scores from 85 - 115 represent the average range of functioning.  Scaled scores from 7 - 13 represent the average range of functioning.     Index Standard Score   Verbal Comprehension 66   Perceptual Reasoning 63   Working Memory 60   Processing Speed 76   Full Scale IQ 60      Subtest Scaled Score   Similarities 1   Vocabulary 6   Information 5   Block Design 4   Matrix Reasoning 3   Visual Puzzles 4   Digit Span    2   Arithmetic   4   Symbol Search 4   Coding 7      ATTENTION AND EXECUTIVE FUNCTIONING  Test of Variables of Attention, Visual  Scores from 85 - 115 represent the average range of functioning.       Measure Quarter 1 Quarter 2 Quarter 3 Quarter 4 Total   Omissions 103 102 <40 <40 <40   Commissions <40 81 77 68 64   Response Time 125 124 126 127 127   Variability 85 93 106 103 102       Bárbara-Barillas Executive Function System Color-Word Interference Test  Scaled Scores from 7 - 13 represent the  average range of functioning.     Measure Scaled Score   Color Naming 7   Word Reading 8   Inhibition 5   Inhibition/Switching 1      Bárbara-Barillas Executive Function System Verbal Fluency Test  Scaled Scores from 7 - 13 represent the average range of functioning.     Measure Scaled Score   Letter Fluency 3   Category Fluency 3   Category Switching Total Correct 8   Category Switching Total Switching Accuracy 9      Bárbara-Barillas Executive Function System Trail Making Test  Scaled Scores from 7 - 13 represent the average range of functioning.     Measure Scaled Score   Visual Scanning 10   Number Sequencing 7   Letter Sequencing 11   Number-Letter Switching 7   Motor Speed 10      Behavior Rating Inventory of Executive Function, 2nd Edition, Self-Report Form  T-scores 65 and higher are considered to be in the  clinically significant  range.     Index/Scale T-Score   Inhibit 55   Self-Monitor 65   Behavioral Regulation Index 59   Shift 71   Emotional Control 61   Emotion Regulation Index 67   Task Completion 69   Working Memory 62   Plan/Organize 55   Cognitive Regulation Index 62   Global Executive Composite 64      MEMORY/ORIENTATION FUNCTIONING  [place] Verbal Learning Test, Second Edition   T-scores from 40 - 60 represent the average range of functioning.  Z-scores from -1.0 to 1.0 represent the average range of functioning. Higher scores are better unless indicated (*)     Measure Raw Score T-score   List A Total Trials 1-5 31 25           Measure   Z-score   List A Trial 1 Free Recall 3 -2.5   List A Trial 5 Free Recall 8 -3   List B Free Recall 5 -1   List A Short-Delay Free Recall 6 -2.5   List A Short-Delay Cued Recall 6 -3   List A Long-Delay Free Recall 4 -3.5   List A Long-Delay Cued Recall 5 -3.5   Correct Recognition Hits 13 -2   False Positives* 14 5   Discriminability 1 -4.5   *A lower score is better     FINE-MOTOR AND VISUAL-MOTOR FUNCTIONING  Grooved Pegboard  Standard scores from 85 - 115  represent the average range of functioning.     Trial Time Standard Score   Dominant (R) 167  <50   Non-Dominant  128  52      [facility] Developmental Test of Visual Motor Integration, Sixth Edition  Standard scores from 85 - 115 represent the average range of functioning.     Raw Score Standard Score   20 50      ADAPTIVE FUNCTIONING    Adaptive Behavior Assessment System, Second Edition  Scaled Scores from 7- 13 represent the average range of functioning.  Composite Scores from 85 - 115 represent the average range of functioning.    Skill Area Scaled Score   Communication 2   Community Use 1   Functional Academics 1   Home Living 9   Health and Safety 2   Leisure 3   Self-Care 1   Self-Direction 3   Social 5     Composite Standard Score   Conceptual 57   Social 52   Practical 66   General Adaptive Composite 61       EMOTIONAL AND BEHAVIORAL FUNCTIONING  For the Clinical Scales on the BASC-3, scores ranging from 60-69 are considered to be in the  at-risk  range and scores of 70 or higher are considered  clinically significant.   For the Adaptive Scales, scores between 30 and 39 are considered to be in the  at-risk  range and scores of 29 or lower are considered  clinically significant.      Behavior Assessment System for Children, Third Edition, Parent Response Form     Clinical Scales T-Score   Adaptive Scales T-score   Hyperactivity 40   Adaptability 55   Aggression 41   Social Skills 50   Conduct Problems 41   Leadership 55   Anxiety 49   Activities of Daily Living 61   Depression 45   Functional Communication 47   Somatization 50         Atypicality 46   Composite Indices     Withdrawal 46   Externalizing Problems  41   Attention Problems 42   Internalizing Problems  48         Behavioral Symptoms Index 43         Adaptive Skills 54      [place] Multiphasic Personality Inventory-Second Edition  T-Scores below 65 represent the average range of functioning on the MMPI-2.  *This measure was administered,  but was not interpretable due to validity concerns.       Agatha Craven, PhD [facility]  NICHOLAS MUÑOZ      To [facility]  [address] [place] [address]

## 2018-09-13 ENCOUNTER — OFFICE VISIT (OUTPATIENT)
Dept: PSYCHIATRY | Facility: CLINIC | Age: 18
End: 2018-09-13
Attending: PSYCHOLOGIST
Payer: COMMERCIAL

## 2018-09-13 DIAGNOSIS — F43.10 POSTTRAUMATIC STRESS DISORDER: Primary | ICD-10-CM

## 2018-09-13 NOTE — PROGRESS NOTES
"OUTPATIENT PSYCHOTHERAPY PROGRESS NOTE    Client Name: Cristiano Chapin   YOB: 2000 (18 year old)   Date of Service:  9/13/18  Time of Service: 2:00pm to 3:00pm (60 minutes)  Service Type(s):  00317 psychotherapy (53-60 min. with patient and/or family)    Diagnoses:   F43.10 Posttraumatic Stress Disorder    Individuals Present: Cristiano    Treatment goal(s) being addressed: process trauma and reduce trauma symptom severity/frequency/intensity    Subjective:  Cristiano reported that she is unable to return to school due to being placed on academic probation for not passing two of her courses. She reported that this was because she was unable to obtain the text books for the course, and could not get them through the school because financial aid processing was delayed.    Cristiano is open to getting a job in retail to keep busy. She needs her resume, which she currently does not have access to (through her old school). She has a meeting scheduled with them next week and hope that they can support her with this.    Cristiano reported that she has cut down smoking to two cigarros a day (previously 5). This has been for about the past month. She continues to have sleep difficulties. Part of the barriers around sleep include sharing a room with her brother. He will reportedly come home in the middle of the night and turn on the TV in the room.  When asked how he would respond if she talked to him about this and requested that he let her sleep, she responded that he \"wouldn't care\" and that he \"has a lot going on.\"    Cristiano discussed course of depressed mood over the last 18 months. Summer of 2017 was particularly difficult. While mood improved slightly, Cristiano reported that mood has been up and down throughout the last year, but that overall there has not been a significant improvement in her mood or functioning.    Treatment:   Met with Cristiano for individual therapy.  Reviewed traumatic events processed " "last session (being pulled out of class by school officer).  Moved into processing Cristiano's transition to a smaller nontraditional school   ([employer]; 6 students in her class). One of Cristiano's friends and classmates was murdered shortly before graduation. Cristiano processed feelings surrounding this loss. This loss along with \"family drama\" contributed to a significant depressive episode during the summer of 2017.  Additionally, father send Cristiano a letter berating her, which made her feel angry and depressed.  Cristiano has not exchanged communication with him since.  Iains depressive episode lasted lasted approximately one month. Cristiano acknowledged thoughts of suicide at that time, though no attempt. Thoughts of her mother kept her from acting on suicidal thoughts.      Assessment and Progress:   Cristiano is a 18 year old  female with a chronic, complex trauma history.  Cristiano has a history of depression and suicidal ideation. Trauma history and ongoing family stress seem to contribute to ongoing mood symptoms.  There is no current suicidal ideation, plan, or intent.  However, depressed mood and impaired functioning (lack of energy, motivation, sleep disturbance, restricted affect) have not improved significantly since starting therapy.  Cristiano may benefit from psychopharmacological treatment to supplement therapy work. Cristiano will give further consideration to consulting with a provider regarding medication management of symptoms.  Cristiano was cooperative throughout session. Affect was restricted, especially during discussion of more emotional topics.    Plan:   Next therapy appointment has been scheduled for 09/27/18 to continue work on treatment goals. Will place a referral for family therapy.    Laura Vasquez, PhD,       Clinical Psychologist    Treatment Plan review due: 12/13/18  "

## 2018-09-13 NOTE — MR AVS SNAPSHOT
After Visit Summary   2018    Rachel Calixto    MRN: [id number]           Patient Information     Date Of Birth          2000        Visit Information        Provider Department      2018 2:00 PM [facility], [facility]        Today's Diagnoses     Posttraumatic stress disorder    -  1       Follow-ups after your visit        Your next 10 appointments already scheduled     Sep 20, 2018  2:00 PM CDT   Child Psychotherapy with Laura Vasquez, PhD   Psychiatry Clinic ([facility])    [place] [id number] [id number] [phone]            Sep 27, 2018  2:00 PM CDT   Child Psychotherapy with Laura Vasquez, PhD   [facility] ([facility])    [address] [address] [phone]              Who to contact     Please call your clinic at [phone] to:    Ask questions about your health    Make or cancel appointments    Discuss your medicines    Learn about your test results    Speak to your doctor            Additional Information About Your Visit        CPowerhart Information     [employer] is an electronic gateway that provides easy, online access to your medical records. With [employer], you can request a clinic appointment, read your test results, renew a prescription or communicate with your care team.     To sign up for [employer] visit the website at [email or web address]   You will be asked to enter the access code listed below, as well as some personal information. Please follow the directions to create your username and password.     Your access code is: [email or web address]  Expires: 10/10/2018  3:29 PM     Your access code will  in 90 days. If you need help or a new code, please contact your [facility] or call [phone] for assistance.        Care EveryWhere ID     This  is your Care EveryWhere ID. This could be used by other organizations to access your [place] medical records  [id number]         Blood Pressure from Last 3 Encounters:   12/20/17 124/77   04/19/17 115/76   09/23/16 133/80    Weight from Last 3 Encounters:   12/20/17 106.2 kg (234 lb 3.2 oz) (>99 %)*   04/19/17 102.9 kg (226 lb 12.8 oz) (99 %)*   09/23/16 100.4 kg (221 lb 6.4 oz) (99 %)*     * Growth percentiles are based on [facility] 2-20 Years data.              Today, you had the following     No orders found for display       Primary Care Provider Office Phone # Fax #    Deb Wood [phone]       [address] [place] [address]        Equal Access to Services     UGO CLEMENS : Hadii maría mooreo Soyareli, waaxda luqchun, qaybta kaalmawood issa, fermin hawk . So [facility] [phone].    ATENCIÓN: Si habla español, tiene a herrera disposición servicios gratuitos de asistencia lingüística. Tri al [phone].    We comply with applicable federal civil rights laws and [place] laws. We do not discriminate on the basis of race, color, national origin, age, disability, sex, sexual orientation, or gender identity.            Thank you!     Thank you for choosing [facility]  for your care. Our goal is always to provide you with excellent care. Hearing back from our patients is one way we can continue to improve our services. Please take a few minutes to complete the written survey that you may receive in the mail after your visit with us. Thank you!             Your Updated Medication List - Protect others around you: Learn how to safely use, store and throw away your medicines at www.disposemymeds.org.          This list is accurate as of 9/13/18  5:04 PM.  Always use your most recent med list.                   Brand Name Dispense Instructions for use Diagnosis    acetaminophen 325 MG tablet    TYLENOL    100 tablet    Take 2 tablets (650 mg) by mouth  every 4 hours as needed for mild pain        albuterol 108 (90 Base) MCG/ACT inhaler    PROAIR HFA/PROVENTIL HFA/VENTOLIN HFA    1 Inhaler    Inhale 2 puffs into the lungs every 6 hours as needed for shortness of breath / dyspnea or wheezing (before activity)        cyclobenzaprine 10 MG tablet    FLEXERIL    30 tablet    Take 0.5-1 tablets (5-10 mg) by mouth 3 times daily as needed for muscle spasms    Lumbar sprain, initial encounter       ferrous sulfate 325 (65 Fe) MG tablet    IRON    60 tablet    Take 1 tablet (325 mg) by mouth 2 times daily    Anemia, unspecified type       ibuprofen 200 MG capsule     60 capsule    Take 200 mg by mouth every 4 hours as needed for fever        levonorgestrel-ethinyl estradiol 0.1-20 MG-MCG per tablet    AVIANE,ALESSE,LESSINA    84 tablet    Take 1 tablet by mouth daily Take at first 1 pill every 6 hours until bleeding slows down.    Encounter for surveillance of contraceptive pills       vitamin D 2000 units tablet     100 tablet    Take 2,000 Units by mouth daily

## 2018-10-08 NOTE — PROGRESS NOTES
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 and emotional difficulties, some of which are demonstrated by Rachel Beth. As such, Rachel Beth s current difficulties should be conceptualized as the result of the cumulative effects of her early history and the subsequent neurodevelopmental (i.e. brain-based) changes. As such, a diagnosis of Static Encephalopathy is appropriate to reflect Rachel Beth s differential brain development in response to early exposure to significant and chronic stress.     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 We strongly recommend that Rachel Beth continue to participate in therapy targeting her trauma symptoms, particularly as she describes therapy as helpful. It will be important for Rachel Beth s therapist to consider the current findings, and Rachel Beth s level in functioning in identifying therapeutic strategies, and adapt strategies accordingly. Specifically, Rachel Beth will likely require frequent repetition when learning new therapeutic strategies, and would benefit from support (e.g., from her mother) in implementing therapy strategies in daily life. Rachel Beth will also likely benefit from action oriented therapy strategies (e.g., role play). It should be noted that Rachel Beth may not verbalize when she does not understand. She displayed a tendency to  go along  or agree with the examiner, which when further pressed highlighted her misunderstanding. We recommend that Rachel Beth s therapist frequently check-in with her or  "ask follow-up questions to assess her comprehension of information.   2. Given Rachel Beth s identified cognitive challenges and diagnosis of Intellectual Disability, she should be considered for disability services/social security support.   3. Rachel Beth will face challenges in her work related to cognitive and adaptive limitations. Given reasoning, processing and working memory weaknesses, Rachel Beth is likely to struggle in jobs that are fast paced or require quick decision making. As such, Rachel Beth is encouraged to consider these factors in pursuing future employment.   4. We encourage Rachel Beth to seek out opportunities to develop job skills over the next year, prior to returning to school. 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 We encourage Rachel Beth to pursue job training/job skills through [employer] ([employer]). The following are VRS offices in your area:   a. [place] VRS Office  [address], [place] [address]  b. [facility] VRS Office  [address], [place] [address]  c. [facility] VRS Office  [facility], [address], [place] [address]     Academic  1. When returning to school, we recommend that Rachel Beth share the results of this outside evaluation, as well as previous school records (e.g., school evaluation reports, IEPs) with her school office of disabilities and request (in writing) that she be considered for academic accommodations through a 504 Plan. The following recommendations should be considered:  a. Rachel Beth will have difficulty remembering information that has only been presented a single time and will likely benefit from repeated presentation of newly learned information, as well as being evaluated in a cued format, such as multiple-choice, with limited response options (e.g., \"yes\" or \"no\"). Multi-modal presentation of information that " includes modeling and demonstration, whenever possible, will likely improve her overall understanding of what is being asked of her. Verbal material could be presented in smaller, less complex, segments and nonverbal material could be simplified to avoid overwhelming her. Rachel Beth learns best when what she is required to learn is brief and concise and presented in combination with simple visual stimuli. In addition, Rachel Beth benefits from repetition. She should practice learning information over a number of days and will benefit from repeated presentation of newly learned information.    b. Directions should be clear, concise, and amenable to demonstration and subsequent practice. Complex instructions should be simplified and multiple step commands should be broken into step-by-step processes.  c. Extended time on assignments and tests should be provided along with allowing the opportunity to complete tasks in a relatively distraction free environment.   d. Supports for Rachel Beth s executive functioning are recommended. Examples include support in: completing an assignment notebook, packing the proper homework materials in her bag, and remembering to hand in completed work.  e. Rachel Beth will need simplification of novel information to promote new learning.  Rachel Beth would also benefit from a preparatory set for learning. Establishing set would include:  i. Clearly explaining the learning objective  ii. Introducing new vocabulary  iii. Providing outlines; and   iv. Relating previously learned material and information to each other.  f. Rachel Beth will benefit from using a  backward chaining  approach to learning new tasks or routines. To implement this strategy, a given task should be broken down into a series of steps with parent or teacher first performing all of the steps and Rachel Beth performing the last. Once successful, Rachel Beth would then proceed to learn the steps in backwards order until she can perform the whole  sequence. This strategy is particularly helpful because it provides more exposure to where the steps of a given task lead and allows the learner to always finish the task and experience success, thus building self-esteem.     g. Given evidence of fine motor weaknesses, the following accommodations are recommedned:  i. Reduction in time constraints especially for assignments that involve writing. Classroom assignments, particularly those that are written could be shortened, or more time could be allocated for their completion.   ii. Rachel Beth would also benefit from reductions in notetaking demands, either by providing her with an outline to take notes on, or being provided with a copy of another peers  notes. This will allow her to listen and learn without having to manage and shift between writing demands.   Home  1. Rachel Beth s mother expressed concerns regarding planning for her future, and her level of independence. In addition to further schooling and job training opportunities, it will be important to work with expert advocates to consider long-term financial planning; health care and health insurance options; options for exercise, social and leisure recreation; support for learning and maintaining stronger skills in areas of daily living, such as self-care, domestic chores, and safety awareness. Consideration of guardianship or other forms of financial and medical decision making support is warranted. This is a lot to manage and it doesn t need to be  all at once,  but all will be issues to consider and address over the next few years, little by little.   2. There are a number of Apps for smartphones and tablets that Rachel Beth may find enjoyable, but also may help her learn skills and real-life routines.  For example:  a. Functional Planning System  b. [employer]- Allows to track movements through phone in real time  c. CanPlan [employer] Works- Learning to complete daily routines  d. Category theAPPy lite- learning  to categorize abstract information  e. ScheduleThinge and [employer]- Teaches home living and money management  fRadha García- Teaches organization and direction following  anuja Visual Currency Calculator  3. There are a number of agencies that support children and adults with developmental disabilities. For example:   a. ARC: http://www.thearc.org/ is the national ARC website.  Each state has a branch, many of which are very active.  b. State Resources List from [employer]:  http://www.nichcy.org/stateshe/      Includes names, addresses, phones and email for state developmental disabilities, special education, vocational rehabilitation, assistive technology, and parent advocacy programs, listed for every state.  c. The [facility] center in [place], [place] (www.pacer.org ) is a [facility] center for providing information, advocacy, and training for parents and professionals, about children's rights within the educational system.  They have produced nationally recognized publications and workshops on these issues.        We hope that our evaluation of Rachel Beth assists you with the planning of her treatment. If you have any questions or comments please feel free to contact us at [phone].    Megan Miles, Ph.D.  Post-Doctoral Fellow  Department of Pediatrics  Division of Clinical Behavioral Neuroscience     Eleazar MENDOZA  Psychometrist  Pediatric Neuropsychology   [facility]    Agatha Craven, Ph.D., L.P., [facility]  Professor of Pediatrics  , Division of Clinical Behavioral Neuroscience     Time Spent: 3 hours professional time, including interview, record review, data integration, and report editing by a neuropsychologist ([id number]);  5 hours of testing administered by a trainee and interpreted by a neuropsychologist, and report writing by a trainee and edited by a neuropsychologist ([id number]).    Previously Billed: 1 hour professional time, including interview, record review,  data integration, and report writing (71877); 5 hours psychometrist testing and documentation under supervision of a neuropsychologist ([id number]).        PEDIATRIC NEUROPSYCHOLOGY CLINIC  CONFIDENTIAL TEST SCORES    Note: These scores are intended for appropriately licensed professionals and should never be interpreted without consideration of the attached narrative report.    Test Results:   Note: The test data listed below use one or more of the following formats:   *Standard Scores have an average of 100 and a standard deviation of 15 (the average range is 85 to 115).   *Scaled Scores have an average of 10 and a standard deviation of 3 (the average range is 7 to 13).   *T-Scores have an average range of 50 and a standard deviation of 10 (the average range is 40 to 60).   *Z-Scores have an average of 0 and a standard deviation of 1 (the average range is -1 to 1).     COGNITIVE FUNCTIONING  Wechsler Adult Intelligence Scale, Fourth Edition   Standard scores from 85 - 115 represent the average range of functioning.  Scaled scores from 7 - 13 represent the average range of functioning.     Index Standard Score   Verbal Comprehension 66   Perceptual Reasoning 63   Working Memory 60   Processing Speed 76   Full Scale IQ 60      Subtest Scaled Score   Similarities 1   Vocabulary 6   Information 5   Block Design 4   Matrix Reasoning 3   Visual Puzzles 4   Digit Span    2   Arithmetic   4   Symbol Search 4   Coding 7      ATTENTION AND EXECUTIVE FUNCTIONING  Test of Variables of Attention, Visual  Scores from 85 - 115 represent the average range of functioning.       Measure Quarter 1 Quarter 2 Quarter 3 Quarter 4 Total   Omissions 103 102 <40 <40 <40   Commissions <40 81 77 68 64   Response Time 125 124 126 127 127   Variability 85 93 106 103 102       Bárbara-Barillas Executive Function System Color-Word Interference Test  Scaled Scores from 7 - 13 represent the average range of functioning.     Measure Scaled Score   Color  Naming 7   Word Reading 8   Inhibition 5   Inhibition/Switching 1      Bárbara-Barillas Executive Function System Verbal Fluency Test  Scaled Scores from 7 - 13 represent the average range of functioning.     Measure Scaled Score   Letter Fluency 3   Category Fluency 3   Category Switching Total Correct 8   Category Switching Total Switching Accuracy 9      Bárbara-Barillas Executive Function System Trail Making Test  Scaled Scores from 7 - 13 represent the average range of functioning.     Measure Scaled Score   Visual Scanning 10   Number Sequencing 7   Letter Sequencing 11   Number-Letter Switching 7   Motor Speed 10      Behavior Rating Inventory of Executive Function, 2nd Edition, Self-Report Form  T-scores 65 and higher are considered to be in the  clinically significant  range.     Index/Scale T-Score   Inhibit 55   Self-Monitor 65   Behavioral Regulation Index 59   Shift 71   Emotional Control 61   Emotion Regulation Index 67   Task Completion 69   Working Memory 62   Plan/Organize 55   Cognitive Regulation Index 62   Global Executive Composite 64      MEMORY/ORIENTATION FUNCTIONING  [place] Verbal Learning Test, Second Edition   T-scores from 40 - 60 represent the average range of functioning.  Z-scores from -1.0 to 1.0 represent the average range of functioning. Higher scores are better unless indicated (*)     Measure Raw Score T-score   List A Total Trials 1-5 31 25           Measure   Z-score   List A Trial 1 Free Recall 3 -2.5   List A Trial 5 Free Recall 8 -3   List B Free Recall 5 -1   List A Short-Delay Free Recall 6 -2.5   List A Short-Delay Cued Recall 6 -3   List A Long-Delay Free Recall 4 -3.5   List A Long-Delay Cued Recall 5 -3.5   Correct Recognition Hits 13 -2   False Positives* 14 5   Discriminability 1 -4.5   *A lower score is better     FINE-MOTOR AND VISUAL-MOTOR FUNCTIONING  Grooved Pegboard  Standard scores from 85 - 115 represent the average range of functioning.     Trial Time Standard  Score   Dominant (R) 167  <50   Non-Dominant  128  52      Beery-Buroulaa Developmental Test of Visual Motor Integration, Sixth Edition  Standard scores from 85 - 115 represent the average range of functioning.     Raw Score Standard Score   20 50      ADAPTIVE FUNCTIONING    Adaptive Behavior Assessment System, Second Edition  Scaled Scores from 7- 13 represent the average range of functioning.  Composite Scores from 85 - 115 represent the average range of functioning.    Skill Area Scaled Score   Communication 2   Community Use 1   Functional Academics 1   Home Living 9   Health and Safety 2   Leisure 3   Self-Care 1   Self-Direction 3   Social 5     Composite Standard Score   Conceptual 57   Social 52   Practical 66   General Adaptive Composite 61       EMOTIONAL AND BEHAVIORAL FUNCTIONING  For the Clinical Scales on the BASC-3, scores ranging from 60-69 are considered to be in the  at-risk  range and scores of 70 or higher are considered  clinically significant.   For the Adaptive Scales, scores between 30 and 39 are considered to be in the  at-risk  range and scores of 29 or lower are considered  clinically significant.      Behavior Assessment System for Children, Third Edition, Parent Response Form     Clinical Scales T-Score   Adaptive Scales T-score   Hyperactivity 40   Adaptability 55   Aggression 41   Social Skills 50   Conduct Problems 41   Leadership 55   Anxiety 49   Activities of Daily Living 61   Depression 45   Functional Communication 47   Somatization 50         Atypicality 46   Composite Indices     Withdrawal 46   Externalizing Problems  41   Attention Problems 42   Internalizing Problems  48         Behavioral Symptoms Index 43         Adaptive Skills 54      [place] Multiphasic Personality Inventory-Second Edition  T-Scores below 65 represent the average range of functioning on the MMPI-2.  *This measure was administered, but was not interpretable due to validity concerns.     JANNTEH MUÑOZ  NICHOLAS NOLAN      [place] [address] [place] [address]

## 2018-11-08 ENCOUNTER — OFFICE VISIT (OUTPATIENT)
Dept: PSYCHIATRY | Facility: CLINIC | Age: 18
End: 2018-11-08
Attending: PSYCHOLOGIST
Payer: COMMERCIAL

## 2018-11-08 DIAGNOSIS — F43.10 POSTTRAUMATIC STRESS DISORDER: Primary | ICD-10-CM

## 2018-11-08 NOTE — MR AVS SNAPSHOT
After Visit Summary   2018    Rachel Calixto    MRN: [id number]           Patient Information     Date Of Birth          2000        Visit Information        Provider Department      2018 2:00 PM Laura Vasquez, PhD [facility]        Today's Diagnoses     Posttraumatic stress disorder    -  1       Follow-ups after your visit        Your next 10 appointments already scheduled     Nov 15, 2018  2:00 PM CST   Child Psychotherapy with Laura Vasquez, PhD   Psychiatry Clinic ([facility])    [place] [id number] [id number] [phone]            2018  2:00 PM CST   Child Psychotherapy with Laura Vasquez, PhD   Psychiatry Clinic ([facility])    [address] [address] [address] [phone]              Who to contact     Please call your clinic at [phone] to:    Ask questions about your health    Make or cancel appointments    Discuss your medicines    Learn about your test results    Speak to your doctor            Additional Information About Your Visit        ShotfarmharMagnasense Information     YooLotto is an electronic gateway that provides easy, online access to your medical records. With YooLotto, you can request a clinic appointment, read your test results, renew a prescription or communicate with your care team.     To sign up for YooLotto visit the website at [email or web address]   You will be asked to enter the access code listed below, as well as some personal information. Please follow the directions to create your username and password.     Your access code is: [email or web address]  Expires: 2019  1:30 AM     Your access code will  in 90 days. If you need help or a new code, please contact your [facility] or call [phone] for assistance.        Care EveryWhere ID     This is  your Care EveryWhere ID. This could be used by other organizations to access your [place] medical records  [id number]         Blood Pressure from Last 3 Encounters:   12/20/17 124/77   04/19/17 115/76   09/23/16 133/80    Weight from Last 3 Encounters:   12/20/17 106.2 kg (234 lb 3.2 oz) (>99 %)*   04/19/17 102.9 kg (226 lb 12.8 oz) (99 %)*   09/23/16 100.4 kg (221 lb 6.4 oz) (99 %)*     * Growth percentiles are based on [facility] 2-20 Years data.              Today, you had the following     No orders found for display       Primary Care Provider Office Phone # Fax #    Deb Wood [phone]       [address] [place] [address]        Equal Access to Services     BARBRA CLEMENS : Hadii aad ku hadasho Soyareli, waaxda luqadaha, qaybta kaalmada maegan, fermin hawk . So [facility] [phone].    ATENCIÓN: Si habla español, tiene a herrera disposición servicios gratuitos de asistencia lingüística. Tri al [phone].    We comply with applicable federal civil rights laws and [place] laws. We do not discriminate on the basis of race, color, national origin, age, disability, sex, sexual orientation, or gender identity.            Thank you!     Thank you for choosing PSYCHIATRY CLINIC  for your care. Our goal is always to provide you with excellent care. Hearing back from our patients is one way we can continue to improve our services. Please take a few minutes to complete the written survey that you may receive in the mail after your visit with us. Thank you!             Your Updated Medication List - Protect others around you: Learn how to safely use, store and throw away your medicines at www.disposemymeds.org.          This list is accurate as of 11/8/18  4:29 PM.  Always use your most recent med list.                   Brand Name Dispense Instructions for use Diagnosis    acetaminophen 325 MG tablet    TYLENOL    100 tablet    Take 2 tablets (650 mg) by mouth every  4 hours as needed for mild pain        albuterol 108 (90 Base) MCG/ACT inhaler    PROAIR HFA/PROVENTIL HFA/VENTOLIN HFA    1 Inhaler    Inhale 2 puffs into the lungs every 6 hours as needed for shortness of breath / dyspnea or wheezing (before activity)        cyclobenzaprine 10 MG tablet    FLEXERIL    30 tablet    Take 0.5-1 tablets (5-10 mg) by mouth 3 times daily as needed for muscle spasms    Lumbar sprain, initial encounter       ferrous sulfate 325 (65 Fe) MG tablet    IRON    60 tablet    Take 1 tablet (325 mg) by mouth 2 times daily    Anemia, unspecified type       ibuprofen 200 MG capsule     60 capsule    Take 200 mg by mouth every 4 hours as needed for fever        levonorgestrel-ethinyl estradiol 0.1-20 MG-MCG per tablet    AVIANE,ALESSE,LESSINA    84 tablet    Take 1 tablet by mouth daily Take at first 1 pill every 6 hours until bleeding slows down.    Encounter for surveillance of contraceptive pills       vitamin D 2000 units tablet     100 tablet    Take 2,000 Units by mouth daily
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
## 2018-11-14 DIAGNOSIS — Z30.41 ENCOUNTER FOR SURVEILLANCE OF CONTRACEPTIVE PILLS: ICD-10-CM

## 2018-11-14 DIAGNOSIS — D64.9 ANEMIA, UNSPECIFIED TYPE: ICD-10-CM

## 2018-11-15 ENCOUNTER — OFFICE VISIT (OUTPATIENT)
Dept: PSYCHIATRY | Facility: CLINIC | Age: 18
End: 2018-11-15
Attending: PSYCHOLOGIST
Payer: COMMERCIAL

## 2018-11-15 ENCOUNTER — TELEPHONE (OUTPATIENT)
Dept: PSYCHIATRY | Facility: CLINIC | Age: 18
End: 2018-11-15

## 2018-11-15 DIAGNOSIS — F43.10 POSTTRAUMATIC STRESS DISORDER: Primary | ICD-10-CM

## 2018-11-15 RX ORDER — LEVONORGESTREL/ETHIN.ESTRADIOL 0.1-0.02MG
1 TABLET ORAL DAILY
Qty: 84 TABLET | Refills: 0 | Status: SHIPPED | OUTPATIENT
Start: 2018-11-15 | End: 2019-01-25

## 2018-11-15 RX ORDER — FERROUS SULFATE 325(65) MG
325 TABLET ORAL 2 TIMES DAILY
Qty: 60 TABLET | Refills: 2 | Status: SHIPPED | OUTPATIENT
Start: 2018-11-15 | End: 2019-01-25

## 2018-11-15 NOTE — MR AVS SNAPSHOT
After Visit Summary   11/15/2018    Rachel Calixto    MRN: [id number]           Patient Information     Date Of Birth          2000        Visit Information        Provider Department      11/15/2018 2:00 PM Laura Vasquez, PhD Psychiatry Clinic        Today's Diagnoses     Posttraumatic stress disorder    -  1       Follow-ups after your visit        Your next 10 appointments already scheduled     2018  2:00 PM CST   Child Psychotherapy with Laura Vasquez, PhD   Psychiatry Clinic ([facility])    [place] [id number]  [address] [id number] [phone]              Who to contact     Please call your clinic at [phone] to:    Ask questions about your health    Make or cancel appointments    Discuss your medicines    Learn about your test results    Speak to your doctor            Additional Information About Your Visit        MyChart Information     Leap is an electronic gateway that provides easy, online access to your medical records. With Leap, you can request a clinic appointment, read your test results, renew a prescription or communicate with your care team.     To sign up for Querylyt visit the website at [email or web address]   You will be asked to enter the access code listed below, as well as some personal information. Please follow the directions to create your username and password.     Your access code is: [email or web address]  Expires: 2019  1:30 AM     Your access code will  in 90 days. If you need help or a new code, please contact your [facility] or call [phone] for assistance.        Care EveryWhere ID     This is your Care EveryWhere ID. This could be used by other organizations to access your [facility] medical records  [id number]         Blood Pressure from Last 3 Encounters:   17 124/77   17 115/76   16 133/80    Weight  from Last 3 Encounters:   12/20/17 106.2 kg (234 lb 3.2 oz) (>99 %)*   04/19/17 102.9 kg (226 lb 12.8 oz) (99 %)*   09/23/16 100.4 kg (221 lb 6.4 oz) (99 %)*     * Growth percentiles are based on [facility] 2-20 Years data.              Today, you had the following     No orders found for display       Primary Care Provider Office Phone # Fax #    Deb Wood [phone]       [address] [place] [address]        Equal Access to Services     [facility]: Hadii maraí edwards hadasho Soyareli, waaxda luqadaha, qaybta kaalmada maegan, fermin hawk . So [facility] [phone].    ATENCIÓN: Si habla español, tiene a herrera disposición servicios gratuitos de asistencia lingüística. [facility] [phone].    We comply with applicable federal civil rights laws and [place] laws. We do not discriminate on the basis of race, color, national origin, age, disability, sex, sexual orientation, or gender identity.            Thank you!     Thank you for choosing PSY[facility]ATRY CLINIC  for your care. Our goal is always to provide you with excellent care. Hearing back from our patients is one way we can continue to improve our services. Please take a few minutes to complete the written survey that you may receive in the mail after your visit with us. Thank you!             Your Updated Medication List - Protect others around you: Learn how to safely use, store and throw away your medicines at www.disposemymeds.org.          This list is accurate as of 11/15/18  4:37 PM.  Always use your most recent med list.                   Brand Name Dispense Instructions for use Diagnosis    acetaminophen 325 MG tablet    TYLENOL    100 tablet    Take 2 tablets (650 mg) by mouth every 4 hours as needed for mild pain        albuterol 108 (90 Base) MCG/ACT inhaler    PROAIR HFA/PROVENTIL HFA/VENTOLIN HFA    1 Inhaler    Inhale 2 puffs into the lungs every 6 hours as needed for shortness of breath / dyspnea or  wheezing (before activity)        cyclobenzaprine 10 MG tablet    FLEXERIL    30 tablet    Take 0.5-1 tablets (5-10 mg) by mouth 3 times daily as needed for muscle spasms    Lumbar sprain, initial encounter       ferrous sulfate 325 (65 Fe) MG tablet    IRON    60 tablet    Take 1 tablet (325 mg) by mouth 2 times daily    Anemia, unspecified type       ibuprofen 200 MG capsule     60 capsule    Take 200 mg by mouth every 4 hours as needed for fever        levonorgestrel-ethinyl estradiol 0.1-20 MG-MCG per tablet    AVIANE,ALESSE,LESSINA    84 tablet    Take 1 tablet by mouth daily Take at first 1 pill every 6 hours until bleeding slows down.    Encounter for surveillance of contraceptive pills       vitamin D 2000 units tablet     100 tablet    Take 2,000 Units by mouth daily
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
## 2018-11-15 NOTE — TELEPHONE ENCOUNTER
On 11/15/2018 the patient signed a PHI authorizing Person to Person communication with Halima Wai/mother for scheduling and medical information.  I sent this document to scanning on 11/15/2018 and kept a copy in Psychiatry until scanning is complete/confirmed. Kristen Richter/GARCIA

## 2018-11-29 ENCOUNTER — OFFICE VISIT (OUTPATIENT)
Dept: PSYCHIATRY | Facility: CLINIC | Age: 18
End: 2018-11-29
Attending: PSYCHOLOGIST
Payer: COMMERCIAL

## 2018-11-29 DIAGNOSIS — F43.10 POSTTRAUMATIC STRESS DISORDER: Primary | ICD-10-CM
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
## 2018-12-06 ENCOUNTER — OFFICE VISIT (OUTPATIENT)
Dept: PSYCHIATRY | Facility: CLINIC | Age: 18
End: 2018-12-06
Attending: PSYCHOLOGIST
Payer: COMMERCIAL

## 2018-12-06 DIAGNOSIS — F43.10 POSTTRAUMATIC STRESS DISORDER: Primary | ICD-10-CM

## 2018-12-06 NOTE — MR AVS SNAPSHOT
After Visit Summary   2018    Rachel Calixto    MRN: [id number]           Patient Information     Date Of Birth          2000        Visit Information        Provider Department      2018 2:00 PM Laura Vasquez, PhD Psychiatry Clinic        Today's Diagnoses     Posttraumatic stress disorder    -  1       Follow-ups after your visit        Your next 10 appointments already scheduled     Dec 13, 2018  2:00 PM CST   Child Psychotherapy with Laura Vasquez, PhD   Psychiatry Clinic ([facility])    [place] [id number] [address] [id number] [phone]            Dec 20, 2018  2:00 PM CST   Child Psychotherapy with Laura Vasquez, PhD   Psychiatry Clinic ([facility])    [address] [address] [phone]              Who to contact     Please call your clinic at [phone] to:    Ask questions about your health    Make or cancel appointments    Discuss your medicines    Learn about your test results    Speak to your doctor            Additional Information About Your Visit        [employer] Information     [employer] is an electronic gateway that provides easy, online access to your medical records. With [employer], you can request a clinic appointment, read your test results, renew a prescription or communicate with your care team.     To sign up for V-[employer] Media visit the website at [email or web address]   You will be asked to enter the access code listed below, as well as some personal information. Please follow the directions to create your username and password.     Your access code is: [email or web address]  Expires: 2019  1:25 PM     Your access code will  in 90 days. If you need help or a new code, please contact your [facility] or call [phone] for assistance.        Care EveryWhere ID     This  is your Care EveryWhere ID. This could be used by other organizations to access your [facility] medical records  [id number]         Blood Pressure from Last 3 Encounters:   12/20/17 124/77   04/19/17 115/76   09/23/16 133/80    Weight from Last 3 Encounters:   12/20/17 106.2 kg (234 lb 3.2 oz) (>99 %)*   04/19/17 102.9 kg (226 lb 12.8 oz) (99 %)*   09/23/16 100.4 kg (221 lb 6.4 oz) (99 %)*     * Growth percentiles are based on [facility] 2-20 Years data.              Today, you had the following     No orders found for display       Primary Care Provider Office Phone # Fax #    Deb Wood [phone]       [address] [place] [address]        Equal Access to Services     [facility] : Hadii maría mooreo Soyareli, waaxda luqchun, qaybta kaalmawood issa, fermin hawk . So [facility] [phone].    ATENCIÓN: Si habla español, tiene a herrera disposición servicios gratuitos de asistencia lingüística. Tri al [phone].    We comply with applicable federal civil rights laws and [place] laws. We do not discriminate on the basis of race, color, national origin, age, disability, sex, sexual orientation, or gender identity.            Thank you!     Thank you for choosing [facility]IN[facility]  for your care. Our goal is always to provide you with excellent care. Hearing back from our patients is one way we can continue to improve our services. Please take a few minutes to complete the written survey that you may receive in the mail after your visit with us. Thank you!             Your Updated Medication List - Protect others around you: Learn how to safely use, store and throw away your medicines at www.disposemymeds.org.          This list is accurate as of 12/6/18  5:09 PM.  Always use your most recent med list.                   Brand Name Dispense Instructions for use Diagnosis    acetaminophen 325 MG tablet    TYLENOL    100 tablet    Take 2 tablets (650 mg) by mouth  every 4 hours as needed for mild pain        albuterol 108 (90 Base) MCG/ACT inhaler    PROAIR HFA/PROVENTIL HFA/VENTOLIN HFA    1 Inhaler    Inhale 2 puffs into the lungs every 6 hours as needed for shortness of breath / dyspnea or wheezing (before activity)        cyclobenzaprine 10 MG tablet    FLEXERIL    30 tablet    Take 0.5-1 tablets (5-10 mg) by mouth 3 times daily as needed for muscle spasms    Lumbar sprain, initial encounter       ferrous sulfate 325 (65 Fe) MG tablet    FEROSUL    60 tablet    Take 1 tablet (325 mg) by mouth 2 times daily    Anemia, unspecified type       ibuprofen 200 MG capsule    ADVIL/MOTRIN    60 capsule    Take 200 mg by mouth every 4 hours as needed for fever        levonorgestrel-ethinyl estradiol 0.1-20 MG-MCG tablet    AVIANE/ALESSE/LESSINA    84 tablet    Take 1 tablet by mouth daily Take at first 1 pill every 6 hours until bleeding slows down.    Encounter for surveillance of contraceptive pills       vitamin D3 2000 units tablet    CHOLECALCIFEROL    100 tablet    Take 2,000 Units by mouth daily
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
## 2019-01-08 ENCOUNTER — TELEPHONE (OUTPATIENT)
Dept: PSYCHIATRY | Facility: CLINIC | Age: 19
End: 2019-01-08

## 2019-01-15 ENCOUNTER — OFFICE VISIT (OUTPATIENT)
Dept: PSYCHIATRY | Facility: CLINIC | Age: 19
End: 2019-01-15
Attending: PSYCHOLOGIST
Payer: COMMERCIAL

## 2019-01-15 DIAGNOSIS — F70 MILD INTELLECTUAL DISABILITY: ICD-10-CM

## 2019-01-15 DIAGNOSIS — F43.10 POSTTRAUMATIC STRESS DISORDER: Primary | ICD-10-CM
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
## 2019-01-25 ENCOUNTER — OFFICE VISIT (OUTPATIENT)
Dept: FAMILY MEDICINE | Facility: CLINIC | Age: 19
End: 2019-01-25
Payer: COMMERCIAL

## 2019-01-25 VITALS
SYSTOLIC BLOOD PRESSURE: 115 MMHG | RESPIRATION RATE: 16 BRPM | BODY MASS INDEX: 45.54 KG/M2 | DIASTOLIC BLOOD PRESSURE: 80 MMHG | TEMPERATURE: 98 F | HEART RATE: 69 BPM | WEIGHT: 241 LBS | OXYGEN SATURATION: 99 %

## 2019-01-25 DIAGNOSIS — M54.59 MECHANICAL LOW BACK PAIN: ICD-10-CM

## 2019-01-25 DIAGNOSIS — R00.2 PALPITATIONS: ICD-10-CM

## 2019-01-25 DIAGNOSIS — G44.209 TENSION HEADACHE: Primary | ICD-10-CM

## 2019-01-25 RX ORDER — FERROUS SULFATE 325(65) MG
325 TABLET ORAL 2 TIMES DAILY
Qty: 60 TABLET | Refills: 2 | Status: SHIPPED | OUTPATIENT
Start: 2019-01-25 | End: 2021-06-04

## 2019-01-25 RX ORDER — CYCLOBENZAPRINE HCL 10 MG
5-10 TABLET ORAL 3 TIMES DAILY PRN
Qty: 30 TABLET | Refills: 1 | Status: SHIPPED | OUTPATIENT
Start: 2019-01-25 | End: 2019-04-04

## 2019-01-25 RX ORDER — LEVONORGESTREL/ETHIN.ESTRADIOL 0.1-0.02MG
1 TABLET ORAL DAILY
Qty: 84 TABLET | Refills: 0 | Status: SHIPPED | OUTPATIENT
Start: 2019-01-25 | End: 2019-04-04

## 2019-01-25 RX ORDER — ACETAMINOPHEN 325 MG/1
650 TABLET ORAL EVERY 4 HOURS PRN
Qty: 100 TABLET | Refills: 0 | Status: SHIPPED | OUTPATIENT
Start: 2019-01-25 | End: 2019-03-20

## 2019-01-25 RX ORDER — ALBUTEROL SULFATE 90 UG/1
2 AEROSOL, METERED RESPIRATORY (INHALATION) EVERY 6 HOURS PRN
Qty: 1 INHALER | Refills: 0 | Status: CANCELLED | OUTPATIENT
Start: 2019-01-25

## 2019-01-25 RX ORDER — CHOLECALCIFEROL (VITAMIN D3) 50 MCG
2000 TABLET ORAL DAILY
Qty: 100 TABLET | Refills: 11 | Status: SHIPPED | OUTPATIENT
Start: 2019-01-25 | End: 2019-06-18

## 2019-01-25 NOTE — PROGRESS NOTES
SUBJECTIVE       Na Ignacia Calixto is a 18 year old  female with a PMH significant for:     Patient Active Problem List   Diagnosis     Health Care Home     Migraine     Vitamin D deficiency     Heart murmur     Posttraumatic stress disorder     Mild intellectual disability     She presents with bitemporal headaches, low back pain and palpitations.  The headaches are daily and bitemporal, they feel like her head is being squeezed.  They are never unilateral, but are associated with neck tightness.  No n/v, no relationship to menses, no weakness.  They do get better if she sleeps, but little improvement with tylenol or ibuprofen.    She also reports low back tightness most days.  It helps if she rubs it or stretches it, but hurts if she lifts anything.  No radicular or cauda equina symptoms, no back injury.  No urinary symptoms, f/c, or weight change.  No help with tylenol or ibuprofen.    She also reports palpitations on a regular basis.  She gets sob with these episodes but she denies any provocative or palliative measures.  She denies syncope or near syncope.  She has a h/o of an intermittent murmur that has been worked up according to pts mother, and was determined to be benign.  No orthopnea, PND or le edema.    PMH, Medications and Allergies were reviewed and updated as needed.        REVIEW OF SYSTEMS     CONSTITUTIONAL: NEGATIVE for fever, chills, change in weight  INTEGUMENTARY/SKIN: NEGATIVE for worrisome rashes, moles or lesions  GI: NEGATIVE for nausea, abdominal pain, heartburn, or change in bowel habits  NEURO: NEGATIVE for weakness, dizziness or paresthesias  PSYCHIATRIC: NEGATIVE for changes in mood or affect        OBJECTIVE     Vitals:    01/25/19 0812   BP: 115/80   Pulse: 69   Resp: 16   Temp: 98  F (36.7  C)   TempSrc: Oral   SpO2: 99%   Weight: 109.3 kg (241 lb)     Body mass index is 45.54 kg/m .    Constitutional: Awake, alert, cooperative, no apparent distress, and appears stated  age.  ENT: Normocephalic, without obvious abnormality, atramatic, sinuses nontender on palpation, external ears without lesions, oral pharynx with moist mucus membranes, tonsils without erythema or exudates, gums normal and good dentition.  Neck: Supple, symmetrical, trachea midline, no adenopathy, thyroid symmetric, not enlarged and no tenderness, skin normal.  Hematologic / Lymphatic: No cervical lymphadenopathy and no supraclavicular lymphadenopathy.  Back: lumbar hyperlordosis, good rom, tender in the paraspinous muscles in the lumbar area, negative slr  Lungs: No increased work of breathing, good air exchange, clear to auscultation bilaterally, no crackles or wheezing.  Cardiovascular: intermittent tachycardia, no murmur, normal PMI, normal S1 and S2  Abdomen: No scars, normal bowel sounds, soft, non-distended, non-tender, no masses palpated, no hepatosplenomegally.  Musculoskeletal: No redness, warmth, or swelling of the joints.  Full range of motion noted.  Motor strength is 5 out of 5 all extremities bilaterally.  Tone is normal.  Neurologic: Awake, alert, oriented to name, place and time.  Cranial nerves II-XII are grossly intact.  Motor is 5 out of 5 bilaterally.  Cerebellar finger to nose, heel to shin intact.  Sensory is intact.  Babinski down going, Romberg negative, and gait is normal.  Neuropsychiatric: Normal affect, mood, orientation, memory and insight.    No results found for this or any previous visit (from the past 24 hour(s)).        ASSESSMENT AND PLAN     1. Tension headache  Flexeril, tylenol, heat    2. Palpitations  Holter monitor  - Holter Monitor 48 hour - Adult; Future    3. Mechanical low back pain  Flexeril, tylenol, heat, rom exercise.  Consider PT if not improved.        RTC in 1 month for follow up of palpitations or sooner if develops new or worsening symptoms.    Santosh Paul

## 2019-02-04 ENCOUNTER — DOCUMENTATION ONLY (OUTPATIENT)
Dept: FAMILY MEDICINE | Facility: CLINIC | Age: 19
End: 2019-02-04

## 2019-02-04 DIAGNOSIS — R00.2 PALPITATIONS: Primary | ICD-10-CM

## 2019-02-04 NOTE — PROGRESS NOTES
"Received fax from [facility], \"We do not do 48 hour Holters. Need new order.\"    24 hour Holter?  Event Monitor? If so, duration?    Please advise.  "

## 2019-02-08 ENCOUNTER — RECORDS - HEALTHEAST (OUTPATIENT)
Dept: ADMINISTRATIVE | Facility: OTHER | Age: 19
End: 2019-02-08

## 2019-03-06 ENCOUNTER — HOSPITAL ENCOUNTER (OUTPATIENT)
Dept: CARDIOLOGY | Facility: CLINIC | Age: 19
Discharge: HOME | End: 2019-03-06
Attending: FAMILY MEDICINE

## 2019-03-06 DIAGNOSIS — R00.2 PALPITATIONS: ICD-10-CM

## 2019-03-07 ENCOUNTER — OFFICE VISIT (OUTPATIENT)
Dept: PSYCHIATRY | Facility: CLINIC | Age: 19
End: 2019-03-07
Attending: PSYCHOLOGIST
Payer: COMMERCIAL

## 2019-03-07 DIAGNOSIS — F43.10 POSTTRAUMATIC STRESS DISORDER: Primary | ICD-10-CM

## 2019-03-08 ASSESSMENT — PATIENT HEALTH QUESTIONNAIRE - PHQ9: SUM OF ALL RESPONSES TO PHQ QUESTIONS 1-9: 15

## 2019-03-11 NOTE — PROGRESS NOTES
"OUTPATIENT PSYCHOTHERAPY PROGRESS NOTE    Client Name: Cristiano Willard   YOB: 2000 (18 year old)   Date of Service:  3/07/19  Time of Service: 2:00pm to 3:00pm (60 minutes)  Service Type(s):  18528 psychotherapy (53-60 min. with patient and/or family)    Diagnoses:   F43.10 Posttraumatic Stress Disorder    Individuals Present: sister Quinonez    Treatment goal(s) being addressed: maintenance of current functioning. Increase behavioral activation and independent/adaptive functioning.    Subjective:  Cristiano wanted her sister to join today's session. Cristiano's father wrote to Cristiano again. This time mother and sister intercepted the letter. They feel that the tone of the letter is more negative and do not feel that Cristiano should read it, as such she hasn't.     Cristiano's doctor reportedly detected an irregular heartbeat. Cristiano has been wearing a heart monitor and will return to the doctor this week for follow up. Cristiano acknowledged that she felt \"nervous\" about having potential heart problems given her family history.     Treatment:   Met with Cristiano for supportive psychotherapy. She wanted younger sister to join the session. Assessed current functioning and progress. Cristiano was able to schedule her own appointment cab ride as instructed for homework. Discussed current physical health and positive changes Cristiano is making towards improving her health (e.g., cutting down on smoking). Discussed other activities that Cristiano can do to increase activity (e.g., walking with sister, taking the stairs).      Assessment and Progress:   Cristiano is a 18 year old  female with a chronic, complex trauma history.  Cristiano reported that her mood has been \"good.\" She denied depressive symptoms. However on the PHQ-9 her score was 15 suggesting moderate depressive symptoms. Affect was euthymic. Cristiano was engaged and cooperative.  She laughed frequently with her sister and seemed to enjoy " having her join the session.      Plan:   Next therapy appointment will be in one month for monitoring maintenance of progress.    Laura Vasquez, PhD, LP      Clinical Psychologist    Treatment Plan review due:

## 2019-03-18 NOTE — LETTER
May 21, 2019      Rachel Ignacia Calixto  [address] [place] [address]        Dear Ms. Calixto,     The birth control medicine that you are currently taking can increase the risk of blood clots and strokes in women with certain types of migraines.  I need to meet with you to understand what type of migraines you have.  Then we can determine if it is safe to continue refilling your birth control pills, or if we need to discuss other options that would be safer.     Please schedule an office visit to go over this.       Sincerely,    Deb Wood MD

## 2019-03-20 DIAGNOSIS — N92.0 MENORRHAGIA WITH REGULAR CYCLE: Primary | ICD-10-CM

## 2019-03-20 RX ORDER — ACETAMINOPHEN 325 MG/1
650 TABLET ORAL EVERY 4 HOURS PRN
Qty: 100 TABLET | Refills: 0 | Status: SHIPPED | OUTPATIENT
Start: 2019-03-20 | End: 2019-06-18

## 2019-03-20 NOTE — TELEPHONE ENCOUNTER
[facility].    Attempted to call Ms. Calixto.  Her mother answered and said the patient wasn't available.  I told her mother I couldn't talk to her without the patient's permission, but it wasn't urgent; I just had a question about a refill request.    I'm calling because I received a refill request for birth control pills.  Looking through her chart, there is mention of headaches -- migraines vs tension headaches.  And if they are migraines, it's unclear whether she has aura with them (flashing lights, blind spots, etc).    Patients with migraines with aura should not be on birth control pills because there is an increased risk of stroke.  There are other forms of birth control that would be okay.    * If she does not have migraines with aura, I'll refill her pills.  * If she does have migraines with aura, then I'd like her to come clinic if she wants to discuss other options.    /AW

## 2019-03-22 RX ORDER — LEVONORGESTREL/ETHIN.ESTRADIOL 0.1-0.02MG
1 TABLET ORAL DAILY
Qty: 84 TABLET | Refills: 0 | Status: CANCELLED | OUTPATIENT
Start: 2019-03-22

## 2019-04-04 ENCOUNTER — OFFICE VISIT (OUTPATIENT)
Dept: FAMILY MEDICINE | Facility: CLINIC | Age: 19
End: 2019-04-04
Payer: COMMERCIAL

## 2019-04-04 VITALS
TEMPERATURE: 98.3 F | BODY MASS INDEX: 44.78 KG/M2 | RESPIRATION RATE: 16 BRPM | OXYGEN SATURATION: 96 % | HEART RATE: 95 BPM | SYSTOLIC BLOOD PRESSURE: 130 MMHG | WEIGHT: 237 LBS | DIASTOLIC BLOOD PRESSURE: 87 MMHG

## 2019-04-04 DIAGNOSIS — J45.30 MILD PERSISTENT ASTHMA, UNSPECIFIED WHETHER COMPLICATED: ICD-10-CM

## 2019-04-04 DIAGNOSIS — R63.5 WEIGHT GAIN: ICD-10-CM

## 2019-04-04 DIAGNOSIS — Z76.89 HEALTH CARE HOME: Primary | ICD-10-CM

## 2019-04-04 DIAGNOSIS — A08.4 VIRAL GASTROENTERITIS: ICD-10-CM

## 2019-04-04 DIAGNOSIS — D50.0 IRON DEFICIENCY ANEMIA DUE TO CHRONIC BLOOD LOSS: ICD-10-CM

## 2019-04-04 LAB
HBA1C MFR BLD: 5.5 % (ref 4.1–5.7)
HEMOGLOBIN: 14 G/DL (ref 11.7–15.7)
TSH SERPL DL<=0.05 MIU/L-ACNC: 0.45 UIU/ML (ref 0.3–5)

## 2019-04-04 RX ORDER — LEVONORGESTREL/ETHIN.ESTRADIOL 0.1-0.02MG
1 TABLET ORAL DAILY
Qty: 84 TABLET | Refills: 0 | Status: SHIPPED | OUTPATIENT
Start: 2019-04-04 | End: 2019-06-18

## 2019-04-04 RX ORDER — CYCLOBENZAPRINE HCL 10 MG
5-10 TABLET ORAL 3 TIMES DAILY PRN
Qty: 30 TABLET | Refills: 1 | Status: SHIPPED | OUTPATIENT
Start: 2019-04-04 | End: 2019-06-18

## 2019-04-04 RX ORDER — ONDANSETRON 4 MG/1
4 TABLET, ORALLY DISINTEGRATING ORAL EVERY 8 HOURS PRN
Qty: 12 TABLET | Refills: 0 | Status: SHIPPED | OUTPATIENT
Start: 2019-04-04 | End: 2019-06-18

## 2019-04-04 RX ORDER — OMEGA-3 FATTY ACIDS/FISH OIL 300-1000MG
200 CAPSULE ORAL EVERY 4 HOURS PRN
Qty: 60 CAPSULE | Refills: 1 | Status: SHIPPED | OUTPATIENT
Start: 2019-04-04 | End: 2019-06-18

## 2019-04-04 RX ORDER — ALBUTEROL SULFATE 90 UG/1
2 AEROSOL, METERED RESPIRATORY (INHALATION) EVERY 6 HOURS PRN
Qty: 8 G | Refills: 11 | Status: SHIPPED | OUTPATIENT
Start: 2019-04-04 | End: 2019-06-18

## 2019-04-04 NOTE — Clinical Note
Dr. Paul- I'm having a hard time figuring out how to get this note closed.  Can you try?  Let me know if not and I'll work with IT, I don't want it to hit 30 days. Vianca

## 2019-04-04 NOTE — LETTER
April 12, 2019      Rachel Beth [place] [id number] [place] [address]        Dear Rachel Beth,    Your bloodwork looked quite good.  Follow up with me if things aren't getting better.    Please see below for your test results.    Resulted Orders   Hemoglobin (HGB) ([facility])   Result Value Ref Range    Hemoglobin 14.0 11.7 - 15.7 g/dL   Hemoglobin A1c ([facility])   Result Value Ref Range    Hemoglobin A1C 5.5 4.1 - 5.7 %   TSH  Sensitive ([facility])   Result Value Ref Range    TSH 0.45 0.30 - 5.00 uIU/mL    Narrative    Test performed by:  [facility]  [address] [place] [address]       If you have any questions, please call the clinic to make an appointment.    Sincerely,    Santosh Paul MD

## 2019-04-04 NOTE — PROGRESS NOTES
Preceptor Attestation:  I was present with the medical student who participated in the service and in the documentation of this note. I have verified the history and personally performed the physical exam and medical decision making. I have verified the content of the note, which accurately reflects my assessment of the patient and the plan of care.   Supervising Physician:  Santosh Paul MD

## 2019-04-09 ENCOUNTER — TELEPHONE (OUTPATIENT)
Dept: FAMILY MEDICINE | Facility: CLINIC | Age: 19
End: 2019-04-09

## 2019-04-12 NOTE — PATIENT INSTRUCTIONS
Your lifestyle referral information:  Patient is scheduled to see Dr Carreno on 5/2 at 8:30 for a Lifestyle visit.    Monica Lynn

## 2019-04-30 NOTE — PROGRESS NOTES
SUBJECTIVE       Rachel Calixto is a 18 year old  female with a PMH significant for:     Patient Active Problem List   Diagnosis     Health Care Home     Migraine     Vitamin D deficiency     Heart murmur     Posttraumatic stress disorder     Mild intellectual disability     She presents for medication refills as well as to follow up on Holter monitor results. Also has some new nausea/vomiting and diarrhea that she would like to have evaluated.    She recently underwent Holter monitoring for palpitations on 3/6/19; results were normal. There were several episodes of tachycardia up to 156 at night. She had reported chest pain/SOB and lightheadedness however this was noted to be during normal sinus rhythm. She has been taking iron for prior dx of iron deficiency anemia, periods are regular and noted to be heavy (4 pads per day).     She is using her rescue inhaler an average of twice daily, also wakes with wheezing/SOB ~3x weekly. Does find the inhaler helpful for the symptoms. Has never had spirometry. She has been smoking 2 cigars (black and mild) daily since she was 16. Has weekly therapy sessions and reports that she has been discussing quitting smoking with her therapist.     Also reports nausea and vomiting that started 2 days ago. Feels nauseous today with one episode of vomiting. Has had diarrhea x2 today, no blood. No one else at home is sick. LMP was 3-4 weeks ago. Not sexually active and is taking her OCP daily. No breast tenderness. She has never had this before. Occasionally will have midline abdominal pain after eating, which resolves within an hour. Using ibuprofen 200mg 2x daily. Does report occasionally seeing blood in her stool but isn't sure how frequently this occurs.     The back pain is currently well controlled with daily flexeril, ibuprofen, heat, and tylenol. Currently she does not feel that she needs PT.     PMH, Medications and Allergies were reviewed and updated as needed.         REVIEW OF SYSTEMS     CONSTITUTIONAL: NEGATIVE for fever, chills, change in weight  ENT/MOUTH: POSITIVE for mild sore throat   RESP: POSITIVE for cough and intermittent SOB/wheezing   CV: POSITIVE for chest pain, palpitations, NEGATIVE for peripheral edema  GI: POSITIVE for nausea, abdominal pain, and diarrhea  ENDOCRINE: NEGATIVE for temperature intolerance  PSYCHIATRIC: NEGATIVE for changes in mood or affect        OBJECTIVE     Vitals:    04/04/19 0913   BP: 130/87   Pulse: 95   Resp: 16   Temp: 98.3  F (36.8  C)   TempSrc: Oral   SpO2: 96%   Weight: 107.5 kg (237 lb)     Body mass index is 44.78 kg/m .    Constitutional: Awake, alert, cooperative, no apparent distress, and appears stated age.  Eyes: Lids and lashes normal  ENT: Normocephalic, without obvious abnormality, atramatic, external ears without lesions, oral pharynx with moist mucus membranes, tonsils without erythema or exudates, gums normal and good dentition.  Neck: Supple, symmetrical, trachea midline, no adenopathy, thyroid symmetric, not enlarged and no tenderness, skin normal.  Lungs: No increased work of breathing, good air exchange, clear to auscultation bilaterally, no crackles or wheezing.  Cardiovascular: Regular rate and rhythm, normal S1 and S2, no S3 or S4, and no murmur noted.  Abdomen: No scars, normal bowel sounds, soft, non-distended, no masses palpated, no hepatosplenomegaly. Diffuse mild tenderness, Low's sign negative.   Neurologic: Awake, alert, oriented to name, place and time.  Cranial nerves II-XII are grossly intact.   Neuropsychiatric: Normal affect, mood, orientation, memory and insight.    No results found for this or any previous visit (from the past 24 hour(s)).      ASSESSMENT AND PLAN     1. Mild persistent asthma, unspecified whether complicated  Reports symptoms c/w mild persistent asthma. Lungs are clear today. Will add controller medication today. Follow up in 4 weeks to assess asthma control and need for  spirometry.   - albuterol (PROAIR HFA/PROVENTIL HFA/VENTOLIN HFA) 108 (90 Base) MCG/ACT inhaler; Inhale 2 puffs into the lungs every 6 hours as needed for shortness of breath / dyspnea or wheezing (before activity)  Dispense: 8 g; Refill: 11  - beclomethasone HFA (QVAR REDIHALER) 40 MCG/ACT inhaler; Inhale 1 puff into the lungs 2 times daily  Dispense: 10.6 g; Refill: 11    2. Sinus tachycardia  Holter monitoring normal apart from episodes of sinus tachycardia particularly at night. Ddx for this includes PTSD, anemia, hyperthyroidism, or symptomatic asthma.   - Hemoglobin (HGB) ([facility])  - TSH  Sensitive ([facility])    3. Weight gain  She is interested in meeting with lifestyle  to discuss weight loss/healthy eating strategies. Also will check TSH to r/o thyroid disease. Will also perform diabetes screening today given strong family history and elevated BMI.   - Lifestyle Management Individual Referral; Future  - Hemoglobin A1c ([facility])  - TSH  Sensitive ([facility])    4. Viral gastroenteritis  Pt reports 2 days of nausea/vomiting and mild diarrhea. Abdomen diffusely tender on exam, Low's sign negative. No concern for pregnancy or gallbladder disease today. Discussed maintaining hydration, will prescribe zofran for nausea.   - ondansetron (ZOFRAN-ODT) 4 MG ODT tab; Take 1 tablet (4 mg) by mouth every 8 hours as needed for nausea  Dispense: 12 tablet; Refill: 0    5. Medication refills   - cyclobenzaprine (FLEXERIL) 10 MG tablet; Take 0.5-1 tablets (5-10 mg) by mouth 3 times daily as needed for muscle spasms  Dispense: 30 tablet; Refill: 1  - levonorgestrel-ethinyl estradiol (AVIANE/ALESSE/LESSINA) 0.1-20 MG-MCG tablet; Take 1 tablet by mouth daily Take at first 1 pill every 6 hours until bleeding slows down.  Dispense: 84 tablet; Refill: 0  - ibuprofen (ADVIL/MOTRIN) 200 MG capsule; Take 1 capsule (200 mg) by mouth every 4 hours as needed for fever  Dispense: 60 capsule; Refill: 1      RTC in 4 weeks for  follow up of asthma, or sooner if develops new or worsening symptoms.    Ashleigh Tucker, MS4    Preceptor Attestation:  I was present with the medical student who participated in the service and in the documentation of this note. I have verified the history and personally performed the physical exam and medical decision making. I have verified the content of the note, which accurately reflects my assessment of the patient and the plan of care.   Supervising Physician:  Santosh Paul MD

## 2019-05-14 NOTE — TELEPHONE ENCOUNTER
Was this ever addressed with the pt? Pharmacy is requesting this medication again today.   Margie Goyal, CLIFFORDA

## 2019-05-14 NOTE — TELEPHONE ENCOUNTER
I attempted to call patient today and TCC.    It appears patient was in clinic on 4/4/19 and received a Rx for levonorgestrel-ethinyl estraiol with no refills.      Is this something that provider wants to continue?      Routed to Dr. Wood/TIMOTHY Jolley RN

## 2019-05-17 NOTE — TELEPHONE ENCOUNTER
Called and got a hold of pt's mother. She stated that a better time to call back would be Monday morning after 9 am. Will try again next week Monday. CLIFFORD MerrittA

## 2019-05-17 NOTE — TELEPHONE ENCOUNTER
Attempted to call and left message for pt to call clinic back. Called and notified pharmacy. Margie SAHA

## 2019-05-21 NOTE — TELEPHONE ENCOUNTER
Third attempted to call and left message to call clinic back. Would you like for me to call back and leave a detailed message or send a letter? Please advise. Margie SAHA

## 2019-05-21 NOTE — TELEPHONE ENCOUNTER
Please send this letter:    Dear Ms. Durga,    The birth control medicine that you are currently taking can increase the risk of blood clots and strokes in women with certain types of migraines.  I need to meet with you to understand what type of migraines you have.  Then we can determine if it is safe to continue refilling your birth control pills, or if we need to discuss other options that would be safer.    Please schedule an office visit to go over this.    Deb Wood MD

## 2019-06-18 ENCOUNTER — OFFICE VISIT (OUTPATIENT)
Dept: FAMILY MEDICINE | Facility: CLINIC | Age: 19
End: 2019-06-18
Payer: COMMERCIAL

## 2019-06-18 VITALS
BODY MASS INDEX: 45.35 KG/M2 | OXYGEN SATURATION: 100 % | WEIGHT: 240 LBS | HEART RATE: 81 BPM | TEMPERATURE: 98.4 F | DIASTOLIC BLOOD PRESSURE: 82 MMHG | SYSTOLIC BLOOD PRESSURE: 122 MMHG

## 2019-06-18 DIAGNOSIS — N92.0 MENORRHAGIA WITH REGULAR CYCLE: Primary | ICD-10-CM

## 2019-06-18 DIAGNOSIS — R10.13 DYSPEPSIA: ICD-10-CM

## 2019-06-18 DIAGNOSIS — B37.31 YEAST INFECTION OF THE VAGINA: ICD-10-CM

## 2019-06-18 DIAGNOSIS — H65.92 OME (OTITIS MEDIA WITH EFFUSION), LEFT: ICD-10-CM

## 2019-06-18 LAB
BACTERIA: NORMAL
CLUE CELLS: NORMAL
HCG UR QL: NEGATIVE
MOTILE TRICHOMONAS: NEGATIVE
ODOR: NORMAL
PH WET PREP: NORMAL (ref 3.8–4.5)
WBC WET PREP: NORMAL (ref 2–5)
YEAST: PRESENT

## 2019-06-18 RX ORDER — CLOTRIMAZOLE 1 %
1 CREAM WITH APPLICATOR VAGINAL AT BEDTIME
Qty: 45 G | Refills: 0 | Status: SHIPPED | OUTPATIENT
Start: 2019-06-18 | End: 2019-06-25

## 2019-06-18 RX ORDER — ACETAMINOPHEN 325 MG/1
650 TABLET ORAL EVERY 4 HOURS PRN
Qty: 100 TABLET | Refills: 0 | Status: SHIPPED | OUTPATIENT
Start: 2019-06-18 | End: 2019-09-20

## 2019-06-18 RX ORDER — OMEGA-3 FATTY ACIDS/FISH OIL 300-1000MG
200 CAPSULE ORAL EVERY 4 HOURS PRN
Qty: 60 CAPSULE | Refills: 1 | Status: SHIPPED | OUTPATIENT
Start: 2019-06-18 | End: 2021-06-04

## 2019-06-18 RX ORDER — ONDANSETRON 4 MG/1
4 TABLET, ORALLY DISINTEGRATING ORAL EVERY 8 HOURS PRN
Qty: 12 TABLET | Refills: 0 | Status: SHIPPED | OUTPATIENT
Start: 2019-06-18 | End: 2020-07-14

## 2019-06-18 RX ORDER — AMOXICILLIN 875 MG
875 TABLET ORAL 2 TIMES DAILY
Qty: 20 TABLET | Refills: 0 | Status: SHIPPED | OUTPATIENT
Start: 2019-06-18 | End: 2020-07-14

## 2019-06-18 RX ORDER — CYCLOBENZAPRINE HCL 10 MG
5-10 TABLET ORAL 3 TIMES DAILY PRN
Qty: 30 TABLET | Refills: 1 | Status: SHIPPED | OUTPATIENT
Start: 2019-06-18 | End: 2019-07-30

## 2019-06-18 RX ORDER — ALBUTEROL SULFATE 90 UG/1
2 AEROSOL, METERED RESPIRATORY (INHALATION) EVERY 6 HOURS PRN
Qty: 8 G | Refills: 11 | Status: SHIPPED | OUTPATIENT
Start: 2019-06-18 | End: 2020-01-15

## 2019-06-18 RX ORDER — CHOLECALCIFEROL (VITAMIN D3) 50 MCG
2000 TABLET ORAL DAILY
Qty: 100 TABLET | Refills: 11 | Status: SHIPPED | OUTPATIENT
Start: 2019-06-18 | End: 2021-06-04

## 2019-06-18 ASSESSMENT — PATIENT HEALTH QUESTIONNAIRE - PHQ9: SUM OF ALL RESPONSES TO PHQ QUESTIONS 1-9: 19

## 2019-06-18 NOTE — PROGRESS NOTES
SUBJECTIVE:   Rachel Beth is a 19-year-old female who comes in for menorrhagia.  She reports that she has regular cycles, but has had heavy bleeding.  She would like to be on OCPs to help control this.  She reports that the last time she was on Depo for nausea and headache, and she would like to try a different agent. She isn't sexually active and UPT is negative today.  She does report that she has had some discharge and vaginal irritation as well.  No abnormal bleeding with this.     She has been having some dyspepsia, which occurred after dietary indiscretions.  She hasn't taken any medicine for this, but would like some.     She has been having left rib pain.     She is unaware of any fever.  She denies any drainage.  Mom reports that she had a history of ear infections when she was little, necessitating PE tubes.     Medications were reviewed and updated today.  Problem list was reviewed.      REVIEW OF SYSTEMS:  She has well-controlled asthma.  No allergy symptoms.      PHYSICAL EXAM:  Exam reveals overweight female in no acute distress.   SKIN:  Supple, with no rashes or ecchymoses.  No pallor or icterus noted.   HEENT:  Normocephalic and atraumatic.  Conjunctivae are clear.  Oropharynx is moist.  Right TM was clear.  Left TM was bulging and erythematous, with an air fluid level.  There is no mastoid tenderness.   NECK:  Supple, with no masses.   LUNGS:  Clear to auscultation bilaterally.   HEART:  Regular rate and rhythm with no murmurs, rubs, or gallops.   ABDOMEN:  Soft, with normoactive bowel sounds.   EXTREMITIES:  Well-perfused, with no cyanosis, clubbing, or edema.   LABS:  UPT was negative.  Wet prep was positive for yeast.      ASSESSMENT/PLAN:   1.  Menorrhagia, with regular cycle.  We'll start patient on OCPs today.  I would like to have her follow up after three months of this therapy.   2.  Vaginal candidiasis.  We'll treat with topical antifungals and reassess if symptoms don't improve.   3.  Left  otitis media.  We'll treat with amoxicillin b.i.d x 10 days.  Follow up if symptoms don't improve.    4.  Dyspepsia.  We'll treat with ranitidine 150 mg b.i.d.  Again, I'll reassess if symptoms don't improve.

## 2019-06-25 ENCOUNTER — OFFICE VISIT (OUTPATIENT)
Dept: PSYCHIATRY | Facility: CLINIC | Age: 19
End: 2019-06-25
Attending: PSYCHOLOGIST
Payer: COMMERCIAL

## 2019-06-25 DIAGNOSIS — F70 MILD INTELLECTUAL DISABILITY: ICD-10-CM

## 2019-06-25 DIAGNOSIS — F43.10 POSTTRAUMATIC STRESS DISORDER: Primary | ICD-10-CM
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
## 2019-07-23 ENCOUNTER — OFFICE VISIT (OUTPATIENT)
Dept: PSYCHIATRY | Facility: CLINIC | Age: 19
End: 2019-07-23
Attending: PSYCHOLOGIST
Payer: COMMERCIAL

## 2019-07-23 DIAGNOSIS — F70 MILD INTELLECTUAL DISABILITY: ICD-10-CM

## 2019-07-23 DIAGNOSIS — F43.10 POSTTRAUMATIC STRESS DISORDER: Primary | ICD-10-CM

## 2019-07-23 ASSESSMENT — PATIENT HEALTH QUESTIONNAIRE - PHQ9: SUM OF ALL RESPONSES TO PHQ QUESTIONS 1-9: 10

## 2019-07-23 NOTE — PROGRESS NOTES
OUTPATIENT PSYCHOTHERAPY PROGRESS NOTE    Client Name: Cristiano [place]   YOB: 2000 (19 year old)   Date of Service:  7/23/19  Time of Service: 3:00pm to 4:00pm (60 minutes)  Service Type(s):  82814 psychotherapy (53-60 min. with patient and/or family)    Diagnoses:   F43.10 Posttraumatic Stress Disorder    Individuals Present: sister [place] (briefly)    Treatment goal(s) being addressed: maintenance of current functioning. Increase behavioral activation and independent/adaptive functioning.    Subjective:  Cristiano has been thinking about her father the past couple of days, as it is his birthday today.  Cristiano's sister has been feeling sad and upset about their father not being in their life. Cristiano is coping better with the range of emotions. Overall, Cristiano has been busy helping with her niece and nephew. The family has been getting out and engaging in fun activities together. Cristiano has some difficulty with sleep and energy, but no concerns regarding mood symptoms or anxiety.    Treatment:   Met with Cristiano for supportive psychotherapy. Discussed plans for behavioral activation including walks on a trail near the family home with her stepfather and other ways to stay busy and active. Cristiano was open to this suggestion.    Assessment and Progress:   Cristiano is a 19 year old  female with a chronic, complex trauma history and ongoing family stress and conflict.  Iains functioning and engagement with the community seems stagnant.  She would benefit from more routine and activity. Affect was subdued/neutral/euthymic. Engagement and reciprocity was decreased, though behavior remained polite and cooperative.  Cristiano was more quiet and energy seemed lower.  Mood is stable. No safety concerns.      Plan:   Next therapy appointment will be in one month (date TBD) for monitoring maintenance of progress. Cristiano will call this provider to schedule.    Laura Vasquez, PhD,  LARON      Clinical Psychologist    Treatment Plan review due:

## 2019-07-30 DIAGNOSIS — M54.59 MECHANICAL LOW BACK PAIN: Primary | ICD-10-CM

## 2019-07-30 RX ORDER — CYCLOBENZAPRINE HCL 10 MG
5-10 TABLET ORAL 3 TIMES DAILY PRN
Qty: 30 TABLET | Refills: 1 | Status: SHIPPED | OUTPATIENT
Start: 2019-07-30 | End: 2019-12-19

## 2019-09-12 ENCOUNTER — OFFICE VISIT (OUTPATIENT)
Dept: PSYCHIATRY | Facility: CLINIC | Age: 19
End: 2019-09-12
Attending: PSYCHOLOGIST
Payer: COMMERCIAL

## 2019-09-12 ENCOUNTER — BEH TREATMENT PLAN (OUTPATIENT)
Dept: PSYCHIATRY | Facility: CLINIC | Age: 19
End: 2019-09-12

## 2019-09-12 DIAGNOSIS — F70 MILD INTELLECTUAL DISABILITY: ICD-10-CM

## 2019-09-12 DIAGNOSIS — F43.10 POSTTRAUMATIC STRESS DISORDER: Primary | ICD-10-CM

## 2019-09-12 ASSESSMENT — PATIENT HEALTH QUESTIONNAIRE - PHQ9: SUM OF ALL RESPONSES TO PHQ QUESTIONS 1-9: 15

## 2019-09-12 NOTE — PROGRESS NOTES
OUTPATIENT TREATMENT PLAN SUMMARY    Date of Treatment Plan: 09/12/19  90-Day Review Date: 12/12/19  Date of Initial Service: 5/03/18 (with current provider)      1. DSM-V Diagnosis (include numeric code)  Posttraumatic Stress Disorder (F34.10)  Mild intellectual disability (F70)  2. Current symptoms and circumstances that substantiate the diagnosis:  Cristiano has a complex and extensive trauma history. She has a difficult time discussing upsetting events from her past and may become tearful or reluctant to discuss. She often has intrusive and distressing memories of events, feels distress when exposed to cues of the event, feels as if she is re-living the event, and experiences physiological reactivity when exposed to trauma cues. Rachel foote reports avoiding cues related to this experience, being unable to recall all the details of the event, and noting diminished interest in activities afterwards (such as going to the park), and feeling detached from others.  She also reported irritability and anger, particularly with her siblings, difficulty concentrating, hypervigilance, and sleep difficulties.    Results of a neuropsychological evaluation (spring 2019) demonstrated mild intellectual impairment and other developmental disabilities.    3. How symptoms and/or behaviors are affecting level of function:   Difficulty engaging in activities outside of the home. Low energy, engagement.    4. Risk Assessment:  Suicide:   Assessed Level of Immediate Risk: None currently  Ideation: Yes  Plan:  No  Means: No  Intent: No  Note: History of suicidal ideation and attempt. No current suicidal ideation, plan, or intent.    Homicide/Violence:  Assessed Level of Immediate Risk: None  Ideation: No  Plan: No  Means: No  Intent: No    If on a medication, please include name and dosage:  No medications currently       Symptom/Problem Measurable Goals Interventions Gains Made   1. PTSD symptoms - avoidance, internalizing Sx 1. In  therapeutic setting, will discuss traumatic event and reduce difficulty by 50% per patient report.     Reduce trauma Sx to within mild range on trauma Sx assessment measure (e.g., TSC-40) 1.CBT; Prolonged Exposure 1. Moderate to significant - improved mood, decreased anxiety   2. Lack of routine, poor sleep 2. Develop consistent routine with increased behavioral activation; routine sleeping schedule. Per patient report 2.CBT: Problem solving, skill building and psychoeducation 2. Minimal - some improvement with daily routine, but continued difficulty with sleep.   3.Poor coping skills (e.g., smoking) 3. Develop positive coping skills to manage negative emotions per patient report. 3.CBT 3. Moderate - has cut down from 5 to 2 cigars/day. More open about thoughts and feelings with others       5. Frequency of Sessions: Monthly (to monitor and maintain progress)    6. Discharge and Aftercare Goals: Cristiano will continue to use skills developed in therapy to manage anxiety, mood, and trauma symptoms. Cristiano may return to therapy if symptoms increase and interfere with functioning.    7. Expected duration of treatment:  6 months    8. Participants in therapy plan (family, friends, support network): Cristiano and Laura Vasquez (psychologist)      See signed  Electronic Consent Form (signed 09/12/19)  for Acknowledgement of Current Treatment Plan      Regulatory Guidelines for Updating Treatment Plan  [place] Medical Assistance: Reviewed & signed at least every 90days  Medicare:  Update per policy

## 2019-09-16 ENCOUNTER — CARE COORDINATION (OUTPATIENT)
Dept: PSYCHIATRY | Facility: CLINIC | Age: 19
End: 2019-09-16

## 2019-09-16 NOTE — PROGRESS NOTES
OUTPATIENT PSYCHOTHERAPY PROGRESS NOTE    Client Name: Cristiano [place]   YOB: 2000 (19 year old)   Date of Service:  9/12/19  Time of Service: 2:05pm to 3:00pm (55 minutes)  Service Type(s):  80721 psychotherapy (53-60 min. with patient and/or family)    Diagnoses:   F43.10 Posttraumatic Stress Disorder  F70 mild intellectual ability    Individuals Present: Cristiano    Treatment goal(s) being addressed: maintenance of current functioning. Increase behavioral activation and independent/adaptive functioning.    Subjective:  Cristiano reported that things have been busy and chaotic at home.  One of her brothers and her nephew moved out of the house into their own place. Cristiano's older sister is preparing to give birth to her second child this week. As such, Cristiano has been watching her 1 year old niece quite a bit.  Cristiano plans to return to school in the spring. She would like to try going out and socializing more (e.g., going to clubs).    Treatment:   Met with Cristiano for supportive psychotherapy and behavioral activation. Reviewed and signed treatment plan (no changes to goals). Reviewed safety strategies for going out to clubs (e.g., staying with trusted family member at all times, not accepting drinks from strangers, making a plan for transportation and communication ahead of time).    Assessment and Progress:   Cristiano is a 19 year old female with a chronic, complex trauma history and ongoing family stress and conflict.  Iains mood has been stable and generally positive for a while.  She would benefit from more community engagement, and increased independence.  She would benefit from more routine and activity. Affect was euthymic. Cristiano was engaged and polite.  Mood is stable. No safety concerns.      Plan:   Cristiano brought with her [facility] paperwork for this provider to sign to document her eligibility for social security income. Next therapy appointment will be on 10/24/19 for  monitoring maintenance of progress.     Laura Vasquez, PhD,       Clinical Psychologist    Treatment Plan review due: 12/12/19

## 2019-09-16 NOTE — PROGRESS NOTES
Writer faxed medical opinion form and CONSUELO for pt to [place] [facility] at [phone].  Routed to scanning.

## 2019-09-19 DIAGNOSIS — N92.0 MENORRHAGIA WITH REGULAR CYCLE: ICD-10-CM

## 2019-09-20 RX ORDER — ACETAMINOPHEN 325 MG/1
650 TABLET ORAL EVERY 4 HOURS PRN
Qty: 100 TABLET | Refills: 0 | Status: SHIPPED | OUTPATIENT
Start: 2019-09-20 | End: 2020-01-27

## 2019-10-24 ENCOUNTER — OFFICE VISIT (OUTPATIENT)
Dept: PSYCHIATRY | Facility: CLINIC | Age: 19
End: 2019-10-24
Attending: PSYCHOLOGIST
Payer: COMMERCIAL

## 2019-10-24 DIAGNOSIS — F43.10 POSTTRAUMATIC STRESS DISORDER: Primary | ICD-10-CM

## 2019-10-24 DIAGNOSIS — F70 MILD INTELLECTUAL DISABILITY: ICD-10-CM

## 2019-10-24 NOTE — PROGRESS NOTES
OUTPATIENT PSYCHOTHERAPY PROGRESS NOTE    Client Name: Cristiano [place]   YOB: 2000 (19 year old)   Date of Service:  10/24/19  Time of Service: 2:30pm to 3:00pm (30 minutes)  Service Type(s):  95284 psychotherapy (30-37 min. with patient and/or family)    Diagnoses:   F43.10 Posttraumatic Stress Disorder  F70 mild intellectual ability    Individuals Present: Cristiano    Treatment goal(s) being addressed: maintenance of current functioning. Increase behavioral activation and independent/adaptive functioning.    Subjective:  Cristiano reported that things continue to be busy and chaotic at home. Her brother and nephew moved back into the home. Her sister is due to give birth any day.  Cristiano continues to care for her 1.5 year old niece quite a bit.  Iains mood has been positive. She would like to increase healthy lifestyle choices and will be meeting with nutritionist next week.    Treatment:   Met with Cristiano for supportive psychotherapy and behavioral activation. Therapeutic strategies used include: supportive listening, motivational interviewing, and goal setting for behavioral activation.    Assessment and Progress:   Cristiano is a 19 year old female with a chronic, complex trauma history and ongoing family stress and conflict.  Iains mood has been stable and generally positive for a while.  She would benefit from more community engagement, and increased independence.  She would benefit from more routine and activity. Affect was euthymic. Cristiano was engaged and polite.  Mood is stable. No safety concerns.      Plan:   Next therapy appointment will be on 11/19/19 for monitoring and maintenance of progress.     Laura Vasquez, PhD,       Clinical Psychologist    Treatment Plan review due: 12/12/19

## 2019-11-19 ENCOUNTER — DOCUMENTATION ONLY (OUTPATIENT)
Dept: PSYCHIATRY | Facility: CLINIC | Age: 19
End: 2019-11-19

## 2019-11-19 ENCOUNTER — OFFICE VISIT (OUTPATIENT)
Dept: PSYCHIATRY | Facility: CLINIC | Age: 19
End: 2019-11-19
Attending: PSYCHOLOGIST
Payer: COMMERCIAL

## 2019-11-19 DIAGNOSIS — F43.10 POSTTRAUMATIC STRESS DISORDER: Primary | ICD-10-CM

## 2019-11-19 DIAGNOSIS — F70 MILD INTELLECTUAL DISABILITY: ICD-10-CM

## 2019-11-19 NOTE — PROGRESS NOTES
OUTPATIENT PSYCHOTHERAPY PROGRESS NOTE    Client Name: Cristiano [place]   YOB: 2000 (19 year old)   Date of Service:  19  Time of Service: 3:05pm to 4:00pm (55 minutes)  Service Type(s):  07666 psychotherapy (53+ min. with patient and/or family)    Diagnoses:   F43.10 Posttraumatic Stress Disorder  F70 mild intellectual disability    Individuals Present: Cristiano    Treatment goal(s) being addressed: maintenance of current functioning. Increase behavioral activation and independent/adaptive functioning.    Subjective:  Cristiano reported feeling 'tired' lately. Her sister has a new born and Cristiano is often tasked to assist with  of her nieces and nephews. Assisting with the  and her 2 year old niece has been draining. Her mood has been 'up and down' as a function of her fatigue.  She makes a point to take breaks and divide childcare duties with her parents and other siblings as able.    Treatment:   Met with Cristiano for supportive psychotherapy and behavioral activation. Therapeutic strategies used include: supportive listening, validation, motivational interviewing, and goal setting for behavioral activation.    Assessment and Progress:   Cristiano is a 19 year old female with a chronic, complex trauma history and ongoing family stress and conflict.  Iains mood has been stable and generally positive for a while.  Some stress and exhaustion related to family stressors and adjustment ( in the family home). Cristiano keeps busy with childcare responsibilities (nieces and nephews), but does not have time or energy for much else.  Affect was euthymic. Cristiano was engaged and polite.  Mood is stable. No safety concerns.      Plan:   Next therapy appointment will be on 19 for monitoring and maintenance of progress.    NOTE: Cristiano brought with her to this appointment a summons for jury duty that she received in the mail. She requested that this provider assist her with  completing the excuse request form due to intellectual disability.    Laura Vasquez, PhD,       Clinical Psychologist    Treatment Plan review due: 12/12/19

## 2019-11-19 NOTE — PROGRESS NOTES
"Documentation Only: Excuse Request for Jury Duty Summons  11/19/19    Cristiano received a summons for jury duty in the mail. She requested that this provider assist with completing the summons and excuse from jury duty request forms. This provider completed the forms along with the following signed letter (per summons instructions) to document Cristiano's intellectual disability.  This provider gave all aforementioned documents to Humza at the conclusion of the therapy appointment.    \"11/19/19  To Whom it May Concern:  This letter is provided at patient request to document her mental disabilities and request  excuse from just duty. My patient, Cristiano [place], has the following mental health  diagnoses that are not conducive for jury duty:  G93.40 Static Encephalopathy  F70 Mild Intellectual Disability  F43.1 Posttraumatic Stress Disorder  Sincerely,  Laura Vasquez, PhD,   Licensed Clinical Psychologist\"          Laura Vasquez, PhD,   Psychologist  "

## 2019-12-18 DIAGNOSIS — M54.59 MECHANICAL LOW BACK PAIN: ICD-10-CM

## 2019-12-19 ENCOUNTER — OFFICE VISIT (OUTPATIENT)
Dept: PSYCHIATRY | Facility: CLINIC | Age: 19
End: 2019-12-19
Attending: PSYCHOLOGIST
Payer: COMMERCIAL

## 2019-12-19 DIAGNOSIS — F70 MILD INTELLECTUAL DISABILITY: ICD-10-CM

## 2019-12-19 DIAGNOSIS — F43.10 POSTTRAUMATIC STRESS DISORDER: Primary | ICD-10-CM

## 2019-12-19 RX ORDER — CYCLOBENZAPRINE HCL 10 MG
TABLET ORAL
Qty: 30 TABLET | Refills: 1 | Status: SHIPPED | OUTPATIENT
Start: 2019-12-19 | End: 2021-06-04

## 2019-12-27 ASSESSMENT — PATIENT HEALTH QUESTIONNAIRE - PHQ9: SUM OF ALL RESPONSES TO PHQ QUESTIONS 1-9: 6

## 2019-12-30 NOTE — PROGRESS NOTES
OUTPATIENT PSYCHOTHERAPY PROGRESS NOTE    Client Name: Cristiano [place]   YOB: 2000 (19 year old)   Date of Service:  19  Time of Service: 2:00pm to 2:55pm (55 minutes)  Service Type(s):  64568 psychotherapy (53+ min. with patient and/or family)    Diagnoses:   F43.10 Posttraumatic Stress Disorder  F70 mild intellectual disability    Individuals Present: Cristiano    Treatment goal(s) being addressed: maintenance of current functioning. Increase behavioral activation and independent/adaptive functioning.    Subjective:  Cristiano continues to have a lot of responsibility assisting with the care of her niece and baby nephew. She has little contact with her sister closest in age, as sister moved out of the house and has been spending time with an older man. Cristiano denies feeling sad or upset about not hearing from sister much anymore despite how close they've been in the past.    Treatment:   Met with Cristiano for supportive psychotherapy and behavioral activation. Therapeutic strategies used include: supportive listening, validation, motivational interviewing, and goal setting for behavioral activation. Challenged Cristiano on her feelings surrounding sister and encouraged her to be open to and non-judgement of her feelings, including negative emotions. Spent some time starting to discuss future therapy goals for the new year.    Assessment and Progress:   Cristiano is a 19 year old female with a chronic, complex trauma history and ongoing family stress and conflict.  Iains mood has been stable.  Some stress and exhaustion related to family stressors and adjustment ( in the family home). Cristiano keeps busy with childcare responsibilities (nieces and nephews), but does not have time or energy for much else.  Affect was euthymic. Cristiano was engaged and polite.  Mood is stable. No safety concerns.      Plan:   Next therapy appointment will be on 20 for monitoring and maintenance of  progress.      Laura Vasquez, PhD, LP      Clinical Psychologist    Treatment Plan review due: 12/12/19

## 2020-01-15 ENCOUNTER — OFFICE VISIT (OUTPATIENT)
Dept: FAMILY MEDICINE | Facility: CLINIC | Age: 20
End: 2020-01-15
Payer: COMMERCIAL

## 2020-01-15 VITALS
OXYGEN SATURATION: 94 % | HEART RATE: 103 BPM | DIASTOLIC BLOOD PRESSURE: 81 MMHG | TEMPERATURE: 98.3 F | SYSTOLIC BLOOD PRESSURE: 119 MMHG | BODY MASS INDEX: 47.31 KG/M2 | WEIGHT: 250.4 LBS | RESPIRATION RATE: 16 BRPM

## 2020-01-15 DIAGNOSIS — R11.2 NON-INTRACTABLE VOMITING WITH NAUSEA, UNSPECIFIED VOMITING TYPE: Primary | ICD-10-CM

## 2020-01-15 DIAGNOSIS — R06.83 SNORING: ICD-10-CM

## 2020-01-15 DIAGNOSIS — Z23 NEED FOR VACCINATION: ICD-10-CM

## 2020-01-15 DIAGNOSIS — Z00.00 HEALTHCARE MAINTENANCE: ICD-10-CM

## 2020-01-15 DIAGNOSIS — J45.20 MILD INTERMITTENT ASTHMA WITHOUT COMPLICATION: ICD-10-CM

## 2020-01-15 LAB
ALBUMIN SERPL-MCNC: 4.2 MG/DL (ref 3.9–5.1)
ALP SERPL-CCNC: 74.9 U/L (ref 40–150)
ALT SERPL-CCNC: <15 U/L (ref 0–45)
AST SERPL-CCNC: <5 U/L (ref 0–45)
BILIRUB SERPL-MCNC: 0.3 MG/DL (ref 0.2–1.3)
BUN SERPL-MCNC: 11.4 MG/DL (ref 7–19)
CALCIUM SERPL-MCNC: 9.9 MG/DL (ref 8.5–10.1)
CHLORIDE SERPLBLD-SCNC: 104.1 MMOL/L (ref 98–110)
CO2 SERPL-SCNC: 27.7 MMOL/L (ref 20–32)
CREAT SERPL-MCNC: 0.7 MG/DL (ref 0.5–1)
GFR SERPL CREATININE-BSD FRML MDRD: >90 ML/MIN/1.7 M2
GLUCOSE SERPL-MCNC: 102.1 MG'DL (ref 70–99)
HIV 1+2 AB+HIV1 P24 AG SERPL QL IA: NEGATIVE
POTASSIUM SERPL-SCNC: 3.5 MMOL/DL (ref 3.2–4.6)
PROT SERPL-MCNC: 7.7 G/DL (ref 6.8–8.8)
SODIUM SERPL-SCNC: 136.5 MMOL/L (ref 132–142)

## 2020-01-15 RX ORDER — ONDANSETRON 4 MG/1
4 TABLET, FILM COATED ORAL EVERY 8 HOURS PRN
Qty: 30 TABLET | Refills: 0 | Status: SHIPPED | OUTPATIENT
Start: 2020-01-15 | End: 2021-06-04

## 2020-01-15 RX ORDER — ALBUTEROL SULFATE 90 UG/1
2 AEROSOL, METERED RESPIRATORY (INHALATION) EVERY 6 HOURS PRN
Qty: 8 G | Refills: 11 | Status: SHIPPED | OUTPATIENT
Start: 2020-01-15 | End: 2021-06-04

## 2020-01-15 NOTE — LETTER
January 16, 2020      Rachel Calixto  [address] [place] [address]        Dear Ms. Calixto,     Your blood tests were normal - normal kidneys, normal liver. Your sugar was a tiny bit high, but this can be normal if you had eaten earlier in the day. Your HIV test was normal/negative too. I'll let you know when I have the results of the stool test and sleep study.     Please see below for your test results.    Resulted Orders   Comprehensive Metabolic Panel ([place])   Result Value Ref Range    Albumin 4.2 3.9 - 5.1 mg/dL    Alkaline Phosphatase 74.9 40.0 - 150.0 U/L    ALT <15 0.0 - 45.0 U/L    AST <5 0.0 - 45.0 U/L    Bilirubin Total 0.3 0.2 - 1.3 mg/dL    Urea Nitrogen 11.4 7.0 - 19.0 mg/dL    Calcium 9.9 8.5 - 10.1 mg/dL    Chloride 104.1 98.0 - 110.0 mmol/L    Carbon Dioxide 27.7 20.0 - 32.0 mmol/L    Creatinine 0.7 0.5 - 1.0 mg/dL    Glucose 102.1 (H) 70.0 - 99.0 mg'dL    Potassium 3.5 3.2 - 4.6 mmol/dL    Sodium 136.5 132.0 - 142.0 mmol/L    Protein Total 7.7 6.8 - 8.8 g/dL    GFR Estimate >90 >60.0 mL/min/1.7 m2    GFR Estimate If Black >90 >60.0 mL/min/1.7 m2   HIV Ag/Ab Screen [place] ([facility])   Result Value Ref Range    HIV Antigen/Antibody Negative Negative    Narrative    Test performed by:  [facility]  [address] [place] [address]  Method is Abbott HIV Ag/Ab for the detection of HIV p24 antigen, HIV-1   antibodies and HIV-2 antibodies.       If you have any questions, please call the clinic to make an appointment.    Sincerely,    Deb Wood MD

## 2020-01-15 NOTE — PATIENT INSTRUCTIONS
Options for your breathin. Add a second inhaler.  The albuterol inhaler is a short-acting one for flare ups.  So some people use long-lasting daily controller inhalers.  2. Look into sleep apnea.  That's when people stop breathing at night.  The first step would be a sleep study.  3. Quitting smoking.  Options: nicotine replacement (patch, gum, etc), Chantix, etc.    Today's plan: we'll call you to help schedule the sleep study.    --------------------    Nausea and vomitin. There is vomiting medicine.  This will help you feel less sick to your stomach.  2. Better yet, if we can find the cause of the vomiting, that would be better.  Blood and stool tests.    20   SLEEP EVALUATION & MANAGEMENT REFERRAL  [facility]  Phone: [phone]  Fax: [phone]    Referral, demographics and medication list faxed to [phone] who will contact the patient to schedule.     Maribel Ellsworth

## 2020-01-16 ASSESSMENT — ASTHMA QUESTIONNAIRES: ACT_TOTALSCORE: 17

## 2020-01-16 NOTE — RESULT ENCOUNTER NOTE
Results for orders placed or performed in visit on 01/15/20  -Comprehensive Metabolic Panel ([place])     Status: Abnormal       Result                                            Value                         Ref Range                       Albumin                                           4.2                           3.9 - 5.1 mg/dL                 Alkaline Phosphatase                              74.9                          40.0 - 150.0 U/L                ALT                                               <15                           0.0 - 45.0 U/L                  AST                                               <5                            0.0 - 45.0 U/L                  Bilirubin Total                                   0.3                           0.2 - 1.3 mg/dL                 Urea Nitrogen                                     11.4                          7.0 - 19.0 mg/dL                Calcium                                           9.9                           8.5 - 10.1 mg/dL                Chloride                                          104.1                         98.0 - 110.0 mmol/L             Carbon Dioxide                                    27.7                          20.0 - 32.0 mmol/L              Creatinine                                        0.7                           0.5 - 1.0 mg/dL                 Glucose                                           102.1 (H)                     70.0 - 99.0 mg'dL               Potassium                                         3.5                           3.2 - 4.6 mmol/dL               Sodium                                            136.5                         132.0 - 142.0 mmol/L            Protein Total                                     7.7                           6.8 - 8.8 g/dL                  GFR Estimate                                      >90                           >60.0 mL/min/1.7 m2             GFR Estimate If Black                              >90                           >60.0 mL/min/1.7 m2        -HIV Ag/Ab Screen [place] ([facility])     Status: None       Result                                            Value                         Ref Range                       HIV Antigen/Antibody                              Negative                      Negative

## 2020-01-22 NOTE — PROGRESS NOTES
Assessment & Plan   Follow-up of asthma, but without formal diagnosis.  -- I'm not convinced that her symptoms are due to asthma.  There are several ways we could approach this: get spirometry; use controller regularly and evaluate response; pursue sleep apnea work-up instead; focus on smoking cessation; etc.  -- Today, she elects to continue albuterol PRN and move forward with a sleep study.  Ordered.    Nausea and vomiting with empty stomach.  While no associated pain or known bleeding, concern for duodenal ulcer.  -- Check H. Pylori fecal antigen.  -- Start acid therapy.  While awaiting H. Pylori results, will start on H2 blocker.  -- Also check CMP.    Healthcare maintenance: Check HIV.  TDaP given.    Return to clinic after sleep study and fecal testing.    Subjective   Rachel Calixto is a 19 year old female with a history including heart murmur who presents for asthma follow-up.    Upon review of her chart, it does not appear that she was ever formally diagnosed with asthma.  Rather, she presented with chest wall pain about 3 years ago, and was trialed on albuterol, which has continued since.  Today, she has 2 inhalers on her list (albuterol PRN and beclomethasone scheduled), but she only uses the albuterol, which she uses on a nightly basis.  She uses it because she has trouble breathing during the night, she has excessive snoring, and her sister has witnessed her to stop breathing.  She endorses daytime somnolence.  Another trigger is cold air.  She smokes cigars.    Her other complaint today is nausea and non-bloody vomiting when she has an empty stomach, occurring 2-3 times per day.  This has been going on for months.  She sometimes has an associated headache, but it is only after vomiting.  Her periods have become more regular and this time.  No med changes.  She does not notice a change in appetite, but she is drinking more water.  No changes in stool, including diarrhea, constipation, or  melena.    Social: She reports that she has been smoking cigars. She has never used smokeless tobacco.    Objective   Vitals: /81 (BP Location: Right arm, Patient Position: Sitting)   Pulse 103   Temp 98.3  F (36.8  C) (Oral)   Resp 16   Wt 113.6 kg (250 lb 6.4 oz)   SpO2 94%   BMI 47.31 kg/m    General: Pleasant. Young woman. Obese. No distress.  Heart: Regular rate and rhythm. Systolic murmur grade II/VI.  Lungs: Clear to auscultation bilaterally. No wheezes or crackles. Good air movement.  GI: Abdomen normal to inspection. No ridigidity, distension, or guarding. Normoactive bowel sounds. Soft and non-tender to palpation throughout abdomen. Unable to assess for organomegaly or masses secondary to habitus.

## 2020-01-27 DIAGNOSIS — N92.0 MENORRHAGIA WITH REGULAR CYCLE: ICD-10-CM

## 2020-01-27 RX ORDER — ACETAMINOPHEN 325 MG/1
TABLET ORAL
Qty: 100 TABLET | Refills: 0 | Status: SHIPPED | OUTPATIENT
Start: 2020-01-27 | End: 2020-03-06

## 2020-01-30 ENCOUNTER — OFFICE VISIT (OUTPATIENT)
Dept: PSYCHIATRY | Facility: CLINIC | Age: 20
End: 2020-01-30
Attending: PSYCHOLOGIST
Payer: COMMERCIAL

## 2020-01-30 DIAGNOSIS — F43.10 POSTTRAUMATIC STRESS DISORDER: Primary | ICD-10-CM

## 2020-01-30 DIAGNOSIS — F70 MILD INTELLECTUAL DISABILITY: ICD-10-CM
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
## 2020-02-13 ENCOUNTER — OFFICE VISIT (OUTPATIENT)
Dept: FAMILY MEDICINE | Facility: CLINIC | Age: 20
End: 2020-02-13
Payer: COMMERCIAL

## 2020-02-13 VITALS
HEART RATE: 104 BPM | TEMPERATURE: 98 F | SYSTOLIC BLOOD PRESSURE: 134 MMHG | OXYGEN SATURATION: 94 % | BODY MASS INDEX: 46.9 KG/M2 | DIASTOLIC BLOOD PRESSURE: 84 MMHG | RESPIRATION RATE: 16 BRPM | WEIGHT: 248.2 LBS

## 2020-02-13 DIAGNOSIS — Z23 NEED FOR PROPHYLACTIC VACCINATION AND INOCULATION AGAINST INFLUENZA: ICD-10-CM

## 2020-02-13 DIAGNOSIS — Z23 NEED FOR VACCINATION: Primary | ICD-10-CM

## 2020-02-13 DIAGNOSIS — R11.2 NON-INTRACTABLE VOMITING WITH NAUSEA, UNSPECIFIED VOMITING TYPE: ICD-10-CM

## 2020-02-13 NOTE — PROGRESS NOTES
Assessment & Plan   Follow-up of nausea and vomiting, worse with empty stomach.  Symptoms slightly improved with H2 blocker, while awaiting return of H pylori fecal antigen before starting PPI, though she has now developed LUQ pain.  This pain is not related to food though, and on exam it seems musculoskeletal.  -- H pylori fecal sample returned today.  Awaiting results.  -- Start PPI x1 month.    Vaccines.  -- Flu shot given today.  -- PPSV23 given today (smoker).    Follow-up depending on results.  If pain persists despite PPI (and H pylori treatment if indicated), then consider EGD.    Subjective   Rachel Calixto is a 19 year old female with no significant past medical history who presents for follow-up of GI symptoms.    She was last seen for this 1 month ago (1/15/20), at which time she was experiencing nausea and vomiting on an empty stomach, improved with food.  There was no pain or known bleeding, and concern was for duodenal ulcer.  Prior to starting a PPI, we planned to check H pylori fecal antigen.  She has been on an H2 blocker in the interim.    She brought the stool sample back today.  Her vomiting is slightly improved, but she has now developed left sided pain.  It lasts for just a few minutes, not associated with eating.  It is most noticeable when lifting.  No diarrhea, constipation, or hematuria.    Social: She reports that she has been smoking cigars. She has never used smokeless tobacco.    Objective   Vitals: /84 (BP Location: Left arm, Patient Position: Sitting)   Pulse 104   Temp 98  F (36.7  C) (Oral)   Resp 16   Wt 112.6 kg (248 lb 3.2 oz)   SpO2 94%   BMI 46.90 kg/m    General: Pleasant. Young woman. Obese. No distress.  Heart: Regular rate and rhythm. No murmurs, rubs, or gallops.  Lungs: Clear to auscultation bilaterally. No wheezes or crackles. Good air movement.  GI: Abdomen normal to inspection. No ridigidity, distension, or guarding. Normoactive bowel sounds.  Tenderness in left upper quadrant, worse with abdominal crunching.

## 2020-02-17 LAB — H PYLORI ANTIGEN: NEGATIVE

## 2020-02-27 ENCOUNTER — BEH TREATMENT PLAN (OUTPATIENT)
Dept: PSYCHIATRY | Facility: CLINIC | Age: 20
End: 2020-02-27

## 2020-02-27 ENCOUNTER — OFFICE VISIT (OUTPATIENT)
Dept: PSYCHIATRY | Facility: CLINIC | Age: 20
End: 2020-02-27
Attending: PSYCHOLOGIST
Payer: COMMERCIAL

## 2020-02-27 DIAGNOSIS — F70 MILD INTELLECTUAL DISABILITY: ICD-10-CM

## 2020-02-27 DIAGNOSIS — F43.10 POSTTRAUMATIC STRESS DISORDER: Primary | ICD-10-CM

## 2020-03-03 NOTE — PROGRESS NOTES
OUTPATIENT TREATMENT PLAN SUMMARY    Date of Treatment Plan: 01/30/20  90-Day Review Date: 04/30/20  Date of Initial Service: 5/03/18 (with current provider)      1. DSM-V Diagnosis (include numeric code)  Posttraumatic Stress Disorder (F34.10)  Mild intellectual disability (F70)  2. Current symptoms and circumstances that substantiate the diagnosis:  Cristiano has a complex and extensive trauma history. She has a difficult time discussing upsetting events from her past and may become tearful or reluctant to discuss. She often has intrusive and distressing memories of events, feels distress when exposed to cues of the event, feels as if she is re-living the event, and experiences physiological reactivity when exposed to trauma cues. Rachel foote reports avoiding cues related to this experience, being unable to recall all the details of the event, and noting diminished interest in activities afterwards (such as going to the park), and feeling detached from others.  She also reported irritability and anger, particularly with her siblings, difficulty concentrating, hypervigilance, and sleep difficulties.    Results of a neuropsychological evaluation (spring 2019) demonstrated mild intellectual impairment and other developmental disabilities.    3. How symptoms and/or behaviors are affecting level of function:   Difficulty engaging in activities outside of the home. Low energy, engagement.    4. Risk Assessment:  Suicide:   Assessed Level of Immediate Risk: None currently  Ideation: Yes  Plan:  No  Means: No  Intent: No  Note: History of suicidal ideation and attempt. No current suicidal ideation, plan, or intent.    Homicide/Violence:  Assessed Level of Immediate Risk: None  Ideation: No  Plan: No  Means: No  Intent: No    If on a medication, please include name and dosage:  No medications currently       Symptom/Problem Measurable Goals Interventions Gains Made   1. PTSD symptoms - avoidance, internalizing Sx 1. In  therapeutic setting, will discuss traumatic event and reduce difficulty by 50% per patient report.     Reduce trauma Sx to within mild range on trauma Sx assessment measure (e.g., TSC-40) 1.CBT; Prolonged Exposure 1. Moderate to significant - improved mood, decreased anxiety   2. Lack of routine, poor sleep, lack of community engagement 2. Develop consistent routine with increased behavioral activation; routine sleeping schedule. Per patient report 2.CBT: Problem solving, skill building and psychoeducation, behavioral activation 2. Minimal - some improvement with daily routine, but continued difficulty with sleep, minimal engagement outside of interaction with family.   3.Poor coping skills (e.g., smoking) 3. Develop positive coping skills to manage negative emotions per patient report. 3.CBT 3. Moderate - has cut down from 5 to 2 cigars/day. More open about thoughts and feelings with others       5. Frequency of Sessions: Monthly (to monitor and maintain progress)    6. Discharge and Aftercare Goals: Cristiano will continue to use skills developed in therapy to manage anxiety, mood, and trauma symptoms. Cristiano may return to therapy if symptoms increase and interfere with functioning.    7. Expected duration of treatment:  6 months    8. Participants in therapy plan (family, friends, support network): Cristiano and Laura Vasquez (psychologist)      See scanned Review of Treatment Plan Form (signed 01/30/20)  for Acknowledgement of Current Treatment Plan      Regulatory Guidelines for Updating Treatment Plan  [place] Medical Assistance: Reviewed & signed at least every 90days  Medicare:  Update per policy
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
## 2020-03-06 DIAGNOSIS — N92.0 MENORRHAGIA WITH REGULAR CYCLE: ICD-10-CM

## 2020-03-06 RX ORDER — ACETAMINOPHEN 325 MG/1
TABLET ORAL
Qty: 100 TABLET | Refills: 0 | Status: SHIPPED | OUTPATIENT
Start: 2020-03-06 | End: 2020-04-15

## 2020-04-11 DIAGNOSIS — N92.0 MENORRHAGIA WITH REGULAR CYCLE: ICD-10-CM

## 2020-04-15 RX ORDER — ACETAMINOPHEN 325 MG/1
TABLET ORAL
Qty: 100 TABLET | Refills: 0 | Status: SHIPPED | OUTPATIENT
Start: 2020-04-15 | End: 2020-06-15

## 2020-06-09 ENCOUNTER — TELEPHONE (OUTPATIENT)
Dept: FAMILY MEDICINE | Facility: CLINIC | Age: 20
End: 2020-06-09

## 2020-06-13 DIAGNOSIS — N92.0 MENORRHAGIA WITH REGULAR CYCLE: ICD-10-CM

## 2020-06-15 RX ORDER — ACETAMINOPHEN 325 MG/1
TABLET ORAL
Qty: 100 TABLET | Refills: 0 | Status: SHIPPED | OUTPATIENT
Start: 2020-06-15 | End: 2021-06-16

## 2020-06-15 RX ORDER — DESOGESTREL AND ETHINYL ESTRADIOL 7 DAYS X 3
KIT ORAL
Qty: 84 TABLET | Refills: 0 | Status: SHIPPED | OUTPATIENT
Start: 2020-06-15 | End: 2020-07-14

## 2020-06-16 NOTE — TELEPHONE ENCOUNTER
Will give 3 months, but I'd like her to have a phone or video visit to discuss before further refills.  I have questions about her migraine history, which affects this medication.    /AW

## 2020-06-16 NOTE — TELEPHONE ENCOUNTER
Attempt to call pt but she will still sleeping. Notified family member to have her call clinic back when she wakes up.     YIFAN Merritt

## 2020-06-19 NOTE — TELEPHONE ENCOUNTER
Called and notified pt of refill. Scheduled pt for a f/u with Dr. Wood on 7/14/20.    YIFAN Merritt

## 2020-07-14 ENCOUNTER — VIRTUAL VISIT (OUTPATIENT)
Dept: FAMILY MEDICINE | Facility: CLINIC | Age: 20
End: 2020-07-14
Payer: COMMERCIAL

## 2020-07-14 VITALS — BODY MASS INDEX: 40.75 KG/M2 | HEIGHT: 63 IN | WEIGHT: 230 LBS

## 2020-07-14 DIAGNOSIS — Z30.41 ENCOUNTER FOR SURVEILLANCE OF CONTRACEPTIVE PILLS: Primary | ICD-10-CM

## 2020-07-14 RX ORDER — ACETAMINOPHEN AND CODEINE PHOSPHATE 120; 12 MG/5ML; MG/5ML
0.35 SOLUTION ORAL DAILY
Qty: 84 TABLET | Refills: 1 | Status: SHIPPED | OUTPATIENT
Start: 2020-07-14 | End: 2021-06-23

## 2020-07-14 ASSESSMENT — MIFFLIN-ST. JEOR: SCORE: 1772.4

## 2020-07-14 NOTE — PROGRESS NOTES
"VIRTUAL VISIT  Due to community spread of COVID-19, this visit was performed as a phone visit.  Person giving verbal consent: Rachel Calixto (patient), on 07/14/20  Visit start time: 4:03  Visit end time: 4:10  Duration of medical discussion: 7 min     Assessment & Plan   Surveillance of contraception, currently on combined hormonal oral contraceptive.  However, with history of migraine with aura, this is contraindicated.  Discussed other options (IUD, implant, injection, and non-estrogen-containing oral).  She elects to try the non-estrogen-containing oral option, and she is confident she can take it at the same time every day.  -- Discontinue combined OCP.  Switch to norethindrone 0.35 mg daily.    Follow-up as needed.    Subjective   Rachel Calixto is a 20 year old female with a history including migraines who wants to discuss contraception.    She requested a refill of her combined oral contraceptive, but I wanted to review her migraine history first.  She reports that before her migraine headaches come on she will sometimes experience spots of blurry vision.    She says she has not tried any form of contraception other than OCPs, which she is satisfied with because it helps regulate her periods.  She sets an alarm on her phone and does not miss doses.  She uses it for contraception as well as menstrual regulation.    Social: She reports that she has been smoking cigars. She has never used smokeless tobacco.    Objective   Vitals: Ht 1.6 m (5' 3\")   Wt 104.3 kg (230 lb)   BMI 40.74 kg/m    Psychiatric: Speech with normal rate and volume.  Thoughts are logical and organized.  No evidence by phone conversation for internal stimuli.    "

## 2020-07-14 NOTE — PROGRESS NOTES
"Family Medicine Telephone Visit Note               Telephone Visit Consent   Patient was verbally read the following and verbal consent was obtained.    \"Telephone visits are billed at different rates depending on your insurance coverage. During this emergency period, for some insurers they may be billed the same as an in-person visit.  Please reach out to your insurance provider with any questions.  If during the course of the call the physician/provider feels a telephone visit is not appropriate, you will not be charged for this service.\"    Name person giving consent:  Patient   Date verbal consent given:  7/14/2020  Time verbal consent given:  4:01 PM           Chief Complaint   Patient presents with     Contraception     f/u BC            "

## 2021-06-04 ENCOUNTER — OFFICE VISIT (OUTPATIENT)
Dept: FAMILY MEDICINE | Facility: CLINIC | Age: 21
End: 2021-06-04
Payer: COMMERCIAL

## 2021-06-04 VITALS
SYSTOLIC BLOOD PRESSURE: 135 MMHG | OXYGEN SATURATION: 98 % | DIASTOLIC BLOOD PRESSURE: 84 MMHG | BODY MASS INDEX: 30.96 KG/M2 | TEMPERATURE: 98 F | RESPIRATION RATE: 18 BRPM | WEIGHT: 174.8 LBS | HEART RATE: 120 BPM

## 2021-06-04 DIAGNOSIS — J45.20 MILD INTERMITTENT ASTHMA WITHOUT COMPLICATION: Primary | ICD-10-CM

## 2021-06-04 DIAGNOSIS — Z02.89 ENCOUNTER FOR COMPLETION OF FORM WITH PATIENT: ICD-10-CM

## 2021-06-04 PROCEDURE — 99214 OFFICE O/P EST MOD 30 MIN: CPT | Mod: GC | Performed by: STUDENT IN AN ORGANIZED HEALTH CARE EDUCATION/TRAINING PROGRAM

## 2021-06-04 RX ORDER — ALBUTEROL SULFATE 90 UG/1
2 AEROSOL, METERED RESPIRATORY (INHALATION) EVERY 6 HOURS PRN
Qty: 8 G | Refills: 11 | Status: SHIPPED | OUTPATIENT
Start: 2021-06-04 | End: 2023-05-18

## 2021-06-04 NOTE — PROGRESS NOTES
Preceptor Attestation:    I discussed the patient with the resident and evaluated the patient in person. I have verified the content of the note, which accurately reflects my assessment of the patient and the plan of care.   Supervising Physician:  Tim Roberto MD.
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
## 2021-06-05 ASSESSMENT — ASTHMA QUESTIONNAIRES: ACT_TOTALSCORE: 8

## 2021-06-08 DIAGNOSIS — M54.59 MECHANICAL LOW BACK PAIN: ICD-10-CM

## 2021-06-08 DIAGNOSIS — E55.9 VITAMIN D DEFICIENCY: Primary | ICD-10-CM

## 2021-06-08 DIAGNOSIS — N92.0 MENORRHAGIA WITH REGULAR CYCLE: ICD-10-CM

## 2021-06-16 RX ORDER — ACETAMINOPHEN 160 MG
TABLET,DISINTEGRATING ORAL
Qty: 100 CAPSULE | Refills: 3 | Status: SHIPPED | OUTPATIENT
Start: 2021-06-16

## 2021-06-16 RX ORDER — CYCLOBENZAPRINE HCL 10 MG
TABLET ORAL
Qty: 30 TABLET | Refills: 1 | Status: SHIPPED | OUTPATIENT
Start: 2021-06-16 | End: 2023-05-18

## 2021-06-16 RX ORDER — ACETAMINOPHEN 325 MG/1
TABLET ORAL
Qty: 100 TABLET | Refills: 0 | Status: SHIPPED | OUTPATIENT
Start: 2021-06-16

## 2021-06-23 ENCOUNTER — OFFICE VISIT (OUTPATIENT)
Dept: FAMILY MEDICINE | Facility: CLINIC | Age: 21
End: 2021-06-23
Payer: COMMERCIAL

## 2021-06-23 ENCOUNTER — RESULTS ONLY (OUTPATIENT)
Dept: FAMILY MEDICINE | Facility: CLINIC | Age: 21
End: 2021-06-23

## 2021-06-23 VITALS
WEIGHT: 178 LBS | HEART RATE: 112 BPM | OXYGEN SATURATION: 96 % | HEIGHT: 61 IN | DIASTOLIC BLOOD PRESSURE: 81 MMHG | BODY MASS INDEX: 33.61 KG/M2 | SYSTOLIC BLOOD PRESSURE: 128 MMHG | RESPIRATION RATE: 20 BRPM | TEMPERATURE: 98.6 F

## 2021-06-23 DIAGNOSIS — G89.29 CHRONIC BACK PAIN, UNSPECIFIED BACK LOCATION, UNSPECIFIED BACK PAIN LATERALITY: ICD-10-CM

## 2021-06-23 DIAGNOSIS — Z00.00 HEALTH CARE MAINTENANCE: Primary | ICD-10-CM

## 2021-06-23 DIAGNOSIS — Z00.00 ROUTINE GENERAL MEDICAL EXAMINATION AT A HEALTH CARE FACILITY: ICD-10-CM

## 2021-06-23 DIAGNOSIS — Z86.39 HISTORY OF ELEVATED GLUCOSE: ICD-10-CM

## 2021-06-23 DIAGNOSIS — Z82.49 FAMILY HISTORY OF PREMATURE CORONARY ARTERY DISEASE: ICD-10-CM

## 2021-06-23 DIAGNOSIS — Z30.41 ENCOUNTER FOR SURVEILLANCE OF CONTRACEPTIVE PILLS: ICD-10-CM

## 2021-06-23 DIAGNOSIS — M54.9 CHRONIC BACK PAIN, UNSPECIFIED BACK LOCATION, UNSPECIFIED BACK PAIN LATERALITY: ICD-10-CM

## 2021-06-23 LAB
CHOLEST SERPL-MCNC: 145.5 MG/DL (ref 0–200)
CHOLEST/HDLC SERPL: 5.3 {RATIO} (ref 0–5)
HBA1C MFR BLD: 5.2 % (ref 4.1–5.7)
HDLC SERPL-MCNC: 27.6 MG/DL
LDLC SERPL CALC-MCNC: 97 MG/DL (ref 0–129)
TRIGL SERPL-MCNC: 102.7 MG/DL (ref 0–150)
VLDL CHOLESTEROL: 20.5 MG/DL (ref 7–32)

## 2021-06-23 PROCEDURE — 36415 COLL VENOUS BLD VENIPUNCTURE: CPT | Performed by: FAMILY MEDICINE

## 2021-06-23 PROCEDURE — 99395 PREV VISIT EST AGE 18-39: CPT | Performed by: FAMILY MEDICINE

## 2021-06-23 PROCEDURE — 83036 HEMOGLOBIN GLYCOSYLATED A1C: CPT | Performed by: FAMILY MEDICINE

## 2021-06-23 PROCEDURE — 80061 LIPID PANEL: CPT | Performed by: FAMILY MEDICINE

## 2021-06-23 RX ORDER — ACETAMINOPHEN AND CODEINE PHOSPHATE 120; 12 MG/5ML; MG/5ML
0.35 SOLUTION ORAL DAILY
Qty: 84 TABLET | Refills: 1 | Status: SHIPPED | OUTPATIENT
Start: 2021-06-23 | End: 2023-05-18

## 2021-06-23 ASSESSMENT — MIFFLIN-ST. JEOR: SCORE: 1502.65

## 2021-06-23 NOTE — PATIENT INSTRUCTIONS
Blood check:  -- A1c (diabetes check)  -- Lipid (cholesterol)  -- Hepatitis C -- we check everybody once    Heart test: CT of your coronary arteries around the heart    Plastic surgery referral for consider breast reduction for back pain    Refilled norethindrone ([facility] [address])    21   ORDER: CT Coronary Calcium Scan   [facility]   [phone]  Fax Orders to [phone]     Order faxed to [phone], they will contact patient to schedule.     Maribel Ellsworth    19   PLASTIC SURGERY REFERRAL  [facility]  [address] [place] [address], [address]  [place] [address]  Appointments: [phone]  Fax: [phone]    Demographics, referral and office note faxed to [phone]. They will contact patient to schedule.     Maribel Ellsworth      Preventive Health Recommendations  Female Ages 21 to 25     Yearly exam:     See your health care provider every year in order to  o Review health changes.   o Discuss preventive care.    o Review your medicines if your doctor has prescribed any.      You should be tested each year for STDs (sexually transmitted diseases).       Talk to your provider about how often you should have cholesterol testing.      Get a Pap test every three years. If you have an abnormal result, your doctor may have you test more often.      If you are at risk for diabetes, you should have a diabetes test (fasting glucose).     Shots:     Get a flu shot each year.     Get a tetanus shot every 10 years.     Consider getting the shot (vaccine) that prevents cervical cancer (Gardasil).    Nutrition:     Eat at least 5 servings of fruits and vegetables each day.    Eat whole-grain bread, whole-wheat pasta and brown rice instead of white grains and rice.    Get adequate Calcium and Vitamin D.     Lifestyle    Exercise at least 150 minutes a week each week (30 minutes a day, 5 days a week). This will help you control your weight and prevent  disease.    Limit alcohol to one drink per day.    No smoking.     Wear sunscreen to prevent skin cancer.    See your dentist every six months for an exam and cleaning.

## 2021-06-23 NOTE — LETTER
July 5, 2021      Rachel Ignacia Calixto  [address] [place] [address]        Dear ,    We are writing to inform you of your test results.    Thank you for coming to clinic.  It was good to see you again.  Here are your test results.     -- Your Hepatitis C test was negative.  This is good.  We don't need to check it anymore.   -- Your cholesterol was good.   -- Your diabetes check was good too.  No diabetes.     Take care,     Resulted Orders   Hemoglobin A1c ([facility])   Result Value Ref Range    Hemoglobin A1C 5.2 4.1 - 5.7 %   Lipid Panel ([facility])   Result Value Ref Range    Cholesterol 145.5 0.0 - 200.0 mg/dL    Cholesterol/HDL Ratio 5.3 (H) 0.0 - 5.0    HDL Cholesterol 27.6 (L) >40.0 mg/dL    LDL Cholesterol Calculated 97 0 - 129 mg/dL    Triglycerides 102.7 0.0 - 150.0 mg/dL    VLDL Cholesterol 20.5 7.0 - 32.0 mg/dL       If you have any questions or concerns, please call the clinic at the number listed above.       Sincerely,      Deb Wood MD

## 2021-06-24 ENCOUNTER — RECORDS - HEALTHEAST (OUTPATIENT)
Dept: ADMINISTRATIVE | Facility: OTHER | Age: 21
End: 2021-06-24

## 2021-06-24 LAB — HCV AB SER QL: NEGATIVE

## 2021-06-25 ENCOUNTER — TRANSCRIBE ORDERS (OUTPATIENT)
Dept: OTHER | Age: 21
End: 2021-06-25

## 2021-06-25 DIAGNOSIS — M54.9 CHRONIC BACK PAIN, UNSPECIFIED BACK LOCATION, UNSPECIFIED BACK PAIN LATERALITY: Primary | ICD-10-CM

## 2021-06-25 DIAGNOSIS — G89.29 CHRONIC BACK PAIN, UNSPECIFIED BACK LOCATION, UNSPECIFIED BACK PAIN LATERALITY: Primary | ICD-10-CM

## 2021-07-03 NOTE — PROGRESS NOTES
Assessment & Plan  Cancer screening:  -- Cervical:  No previous Pap smear.  Recommended, but patient declined.    Disease screening:  -- Obesity (BMI 33): Diabetes and lipid screening ordered.  -- HIV screening: Negative 1/15/20.  -- Hep C screening: Ordered.    Immunizations:  -- TDaP: Up-to-date (01/15/20).    Other:  -- Plastic surgery referral to consider breast reduction for back pain.  -- Chest pain with family history of earlier coronary disease.  Check coronary calcium score.  -- Refilled norethindrone.  History of migraines with aura, so avoiding estrogen.    Female Physical Note    Concerns today:   Chief Complaint   Patient presents with     Physical     CPE and no pap    - Chest pain.  Heart feels like it is squeezing, comes on when playing with nephews.  She has a history of murmur, with work-up at [facility] that was normal, including EKG and Echo.  Mother with stents when patient was 8-9 years old.  - Back pain.  No leg weakness or numbness.  Has tried losing weight, but continues to have pain.    Wt Readings from Last 5 Encounters:   06/23/21 80.7 kg (178 lb)   06/04/21 79.3 kg (174 lb 12.8 oz)   07/14/20 104.3 kg (230 lb)   02/13/20 112.6 kg (248 lb 3.2 oz)   01/15/20 113.6 kg (250 lb 6.4 oz)     Review of Systems  Eye  [] Vision change  [] Double vision  [] Red eyes    HEENT  [] Hearing change  [] Sinus pain  [] Throat pain    Musculoskeletal  [] Joint pain  [x] Muscle pain  [] Swelling Constitutional  [] Fever or chills  [] Weight change  [] Tiredness  [] Sleep problems    GI  [] Nausea/Vomiting  [] Heartburn  [] Diarrhea  [] Constipation  [] Stomach pain Heme  [] Concerning bumps  [] Bleeding problems    CV  [x] Chest pain  [] Heart flutters  [] Pain with walking    Psych  [] Depression or anxiety    Neuro  [x] Headache  [] Loss of strength or feeling  [] Numbness or tingling  [] Dizziness   [] Change in urine  [] Leakage of urine  [] Sexual problems    Respiratory  [] Wheezing  []  Cough  [] Difficulty breathing    Endo  [] Sensitive to heat or cold  [] Thirsty    [] None     Sexually Active: No  Sexual concerns: No   Contraception: On OCP   P: 0  No LMP recorded. (Menstrual status: Birth Control).   STD History: Neg    Patient Active Problem List   Diagnosis     Health Care Home     Migraine     Vitamin D deficiency     Heart murmur     Posttraumatic stress disorder     Mild intellectual disability     Current Outpatient Medications   Medication Sig Dispense Refill     acetaminophen (TYLENOL) 325 MG tablet TAKE 2 TABLETS(650 MG) BY MOUTH EVERY 4 HOURS AS NEEDED FOR MILD PAIN 100 tablet 0     albuterol (PROAIR HFA/PROVENTIL HFA/VENTOLIN HFA) 108 (90 Base) MCG/ACT inhaler Inhale 2 puffs into the lungs every 6 hours as needed for shortness of breath / dyspnea or wheezing (before activity) 8 g 11     beclomethasone HFA (QVAR REDIHALER) 40 MCG/ACT inhaler Inhale 1 puff into the lungs 2 times daily 10.6 g 11     Cholecalciferol (VITAMIN D3) 50 MCG (2000 UT) CAPS TAKE 1 CAPSULE BY MOUTH EVERY  capsule 3     cyclobenzaprine (FLEXERIL) 10 MG tablet TAKE 1/2 TO 1 TABLET(5 TO 10 MG) BY MOUTH THREE TIMES DAILY AS NEEDED FOR MUSCLE SPASMS 30 tablet 1     norethindrone (MICRONOR) 0.35 MG tablet Take 1 tablet (0.35 mg) by mouth daily 84 tablet 1       History reviewed. No pertinent past medical history.     Family History     Problem (# of Occurrences) Relation (Name,Age of Onset)    Diabetes (1) Father    Heart Disease (1) Mother       Negative family history of: Cancer        Problem List Medication List and Allergy List were reviewed.    Patient is an established patient of this clinic..    Social History     Tobacco Use     Smoking status: Current Some Day Smoker     Types: Cigars     Smokeless tobacco: Never Used     Tobacco comment: Mom smokes outside of the house, most of the time    Substance Use Topics     Alcohol use: No     Single.  No children.    Has anyone hurt you physically, for  "example by pushing, hitting, slapping or kicking you or forcing you to have sex? Denies  Do you feel threatened or controlled by a partner, ex-partner or anyone in your life? Denies    RISK BEHAVIORS AND HEALTHY HABITS:  Tobacco Use/Smoking: Current smoker  Illicit Drug Use: None  Do you use alcohol? No  Diet (5-7 servings of fruits/veg daily): Yes   Exercise (30 min accumulated most days):No  Dental Care: No   Calcium 1500 mg/d:  Yes  Seat Belt Use: Yes     Immunization History   Administered Date(s) Administered     DTAP (<7y) 1998, 1998, 1998, 08/25/1999, 02/19/2003     HEPA 04/01/2009, 09/28/2010     HPV 04/01/2009, 08/18/2009, 09/28/2010     HepB 2000, 1998, 1998     Hib (PRP-T) 1998, 1998, 1998     Influenza (IIV3) PF 09/28/2010, 10/16/2013     Influenza Vaccine, 6+MO IM (QUADRIVALENT W/PRESERVATIVES) 10/29/2014, 01/22/2016, 12/20/2017, 02/13/2020     MMR 08/25/1999, 12/07/2004     Meningococcal (Menactra ) 07/29/2011, 10/29/2014     Pneumococcal 23 valent 02/13/2020     Poliovirus, inactivated (IPV) 1998, 1998, 1998, 02/19/2003     TDAP Vaccine (Boostrix) 01/15/2020     Tdap (Adacel,Boostrix) 08/18/2009     Varicella 08/25/1999, 04/01/2009    Reviewed Immunization Record Today    EXAMINATION:   /81   Pulse 112   Temp 98.6  F (37  C) (Oral)   Resp 20   Ht 1.554 m (5' 1.18\")   Wt 80.7 kg (178 lb)   SpO2 96%   BMI 33.43 kg/m    GENERAL: healthy, alert and no distress  EYES: Eyes grossly normal to inspection, extraocular movements - intact, and PERRL  HENT: ear canals- normal; TMs- normal; Nose- normal; Mouth- no ulcers, no lesions  NECK: no tenderness, no adenopathy, no asymmetry, no masses, no stiffness; thyroid- normal to palpation  RESP: lungs clear to auscultation - no rales, no rhonchi, no wheezes  CV: regular rates and rhythm, normal S1 S2, no S3 or S4 and no murmur, no click or rub -  ABDOMEN: soft, no tenderness, no  " hepatosplenomegaly, no masses, normal bowel sounds  MS: extremities- no gross deformities noted, no edema  SKIN: no suspicious lesions, no rashes  NEURO: strength and tone- normal, sensory exam- grossly normal, mentation- intact, speech- normal, reflexes- symmetric  BACK: no CVA tenderness, no paralumbar tenderness  - declined by patient  PSYCH: Alert and oriented times 3; speech- coherent , normal rate and volume; able to articulate logical thoughts, able to abstract reason, no tangential thoughts, no hallucinations or delusions, affect- normal  LYMPHATICS: ant. cervical- normal, post. cervical- normal, supraclavicular- normal

## 2021-07-04 NOTE — RESULT ENCOUNTER NOTE
Results for orders placed or performed in visit on 06/23/21  -Hepatitis C Antibody ([facility])     Status: None       Result                                            Value                         Ref Range                       Hepatitis C Antibody Screen                       Negative                      Negative                       Narrative    Test performed by:    [facility] [address] [place] [address]  Results for orders placed or performed in visit on 06/23/21  -Hemoglobin A1c ([facility])     Status: None       Result                                            Value                         Ref Range                       Hemoglobin A1C                                    5.2                           4.1 - 5.7 %                -Lipid Panel ([place])     Status: Abnormal       Result                                            Value                         Ref Range                       Cholesterol                                       145.5                         0.0 - 200.0 mg/dL               Cholesterol/HDL Ratio                             5.3 (H)                       0.0 - 5.0                       HDL Cholesterol                                   27.6 (L)                      >40.0 mg/dL                     LDL Cholesterol Calculated                        97                            0 - 129 mg/dL                   Triglycerides                                     102.7                         0.0 - 150.0 mg/dL               VLDL Cholesterol                                  20.5                          7.0 - 32.0 mg/dL

## 2021-07-21 ENCOUNTER — HOSPITAL ENCOUNTER (OUTPATIENT)
Dept: CT IMAGING | Facility: CLINIC | Age: 21
Discharge: HOME | End: 2021-07-21
Attending: FAMILY MEDICINE | Admitting: FAMILY MEDICINE
Payer: COMMERCIAL

## 2021-07-21 DIAGNOSIS — Z82.49 FAMILY HISTORY OF PREMATURE CORONARY ARTERY DISEASE: ICD-10-CM

## 2021-07-21 LAB
CV CALCIUM SCORE AGATSTON LM: 0
CV CALCIUM SCORING AGATSON LAD: 0
CV CALCIUM SCORING AGATSTON CX: 0
CV CALCIUM SCORING AGATSTON RCA: 0
CV CALCIUM SCORING AGATSTON TOTAL: 0

## 2021-07-21 PROCEDURE — 75571 CT HRT W/O DYE W/CA TEST: CPT

## 2021-07-21 PROCEDURE — 75571 CT HRT W/O DYE W/CA TEST: CPT | Mod: 26 | Performed by: GENERAL ACUTE CARE HOSPITAL

## 2021-07-21 NOTE — LETTER
August 3, 2021      Rachel Beth JAI [place]  [address] [place] [address]        Dear ,    We are writing to inform you of your test results.    Thank you for having your heart test done.  I'm happy to let you know that your score of calcium in your heart arteries was 0.  That's the best score possible.       Resulted Orders   CT Calcium Screening   Result Value Ref Range    Agatston Score of Left Main 0     Agatston Score of the Left Anterior Descending 0     Agatston Score of Circumflex 0     Agatston Score of Right Coronary Artery 0     Total Score 0.00     Narrative      Total Agatston score of 0. A calcium score of zero places the individual   in the lowest quartile when compared to an age and gender matched control   group.          If you have any questions or concerns, please call the clinic at the number listed above.       Sincerely,      Deb Wood MD

## 2021-08-03 NOTE — RESULT ENCOUNTER NOTE
Total Agatston score of 0. A calcium score of zero places the individual in the lowest quartile when compared to an age and gender matched control group.

## 2021-08-19 NOTE — RESULT ENCOUNTER NOTE
OVERREAD: DETAILED [facility] EXTRACARDIAC OVERREAD OF CARDIAC CT   LOCATION: [facility]  DATE/TIME: 7/21/2021 1:55 PM     INDICATION: Family history of premature coronary artery disease.  TECHNIQUE: Dose reduction techniques were used.  COMPARISON: None.     FINDINGS:    LIMITED CHEST: Negative.  LIMITED MEDIASTINUM: Negative.  LIMITED UPPER ABDOMEN: Negative.       IMPRESSION:    1.  No significant incidental extracardiac findings.  2.  Please refer to cardiologist's dictation for the cardiac CT report.

## 2022-03-10 ENCOUNTER — TELEPHONE (OUTPATIENT)
Dept: FAMILY MEDICINE | Facility: CLINIC | Age: 22
End: 2022-03-10
Payer: COMMERCIAL

## 2022-03-10 NOTE — TELEPHONE ENCOUNTER
Patient Quality Outreach      Summary:    Patient has the following on her problem list/HM:     Asthma review       ACT Total Scores 6/4/2021   ACT TOTAL SCORE (Goal Greater than or Equal to 20) 8   In the past 12 months, how many times did you visit the emergency room for your asthma without being admitted to the hospital? 1   In the past 12 months, how many times were you hospitalized overnight because of your asthma? 0          Patient is due/failing the following:   Asthma  -  ACT needed, Asthma follow-up visit and AAP  Cervical Cancer Screening - PAP Needed    Type of outreach:    Phone, spoke to patient/parent. Scheduled pt for 3/25/22 at 3:50 pm    Questions for provider review:    Should we attempt PAP this day too or schedule for another day?                                                                                                                                       YIFAN Merritt       Chart routed to Provider.

## 2022-09-10 NOTE — MR AVS SNAPSHOT
After Visit Summary   11/29/2018    Rachel Calixto    MRN: [id number]           Patient Information     Date Of Birth          2000        Visit Information        Provider Department      11/29/2018 2:00 PM Larua Vasquez, PhD [facility]        Today's Diagnoses     Posttraumatic stress disorder    -  1       Follow-ups after your visit        Your next 10 appointments already scheduled     Dec 06, 2018  2:00 PM CST   Child Psychotherapy with Laura Vasquez, PhD   Psychiatry Clinic ([facility])    [place] [id number] [id number] [phone]            Dec 13, 2018  2:00 PM CST   Child Psychotherapy with Laura Vasquez, PhD   Psychiatry Clinic ([facility])    [address] [id number] [phone]            Dec 20, 2018  2:00 PM CST   Child Psychotherapy with Laura Vasquez, PhD   Psychiatry Clinic ([facility])    [place] [id number] [address] [phone]              Who to contact     Please call your clinic at [phone] to:    Ask questions about your health    Make or cancel appointments    Discuss your medicines    Learn about your test results    Speak to your doctor            Additional Information About Your Visit        MyChart Information     [employer] is an electronic gateway that provides easy, online access to your medical records. With [employer], you can request a clinic appointment, read your test results, renew a prescription or communicate with your care team.     To sign up for [place] [facility] visit the website at [email or web address]   You will be asked to enter the access code listed below, as well as some personal information. Please follow the directions to create your username and password.     Your access code is:  RX sent to pharmacy [email or web address]  Expires: 2019  1:25 PM     Your access code will  in 90 days. If you need help or a new code, please contact your [facility] or call [phone] for assistance.        Care EveryWhere ID     This is your Care EveryWhere ID. This could be used by other organizations to access your [place] medical records  [id number]         Blood Pressure from Last 3 Encounters:   17 124/77   17 115/76   16 133/80    Weight from Last 3 Encounters:   17 106.2 kg (234 lb 3.2 oz) (>99 %)*   17 102.9 kg (226 lb 12.8 oz) (99 %)*   16 100.4 kg (221 lb 6.4 oz) (99 %)*     * Growth percentiles are based on [facility] 2-20 Years data.              Today, you had the following     No orders found for display       Primary Care Provider Office Phone # Fax #    Deb Wood [phone]       [address] [place] [address]        Equal Access to Services     BARBRA CLEMENS AH: Hadii maría edwards hadtoshia Soyareli, waaxda luedy, qaybta kaalmawood issa, fermin hawk . So [facility] [phone].    ATENCIÓN: Si habla español, tiene a herrera disposición servicios gratuitos de asistencia lingüística. [facility] [phone].    We comply with applicable federal civil rights laws and [place] laws. We do not discriminate on the basis of race, color, national origin, age, disability, sex, sexual orientation, or gender identity.            Thank you!     Thank you for choosing PSY[facility]ATRY CLINIC  for your care. Our goal is always to provide you with excellent care. Hearing back from our patients is one way we can continue to improve our services. Please take a few minutes to complete the written survey that you may receive in the mail after your visit with us. Thank you!             Your Updated Medication List - Protect others around you: Learn how to safely use, store and throw away your medicines at www.disposemymeds.org.           This list is accurate as of 11/29/18  3:00 PM.  Always use your most recent med list.                   Brand Name Dispense Instructions for use Diagnosis    acetaminophen 325 MG tablet    TYLENOL    100 tablet    Take 2 tablets (650 mg) by mouth every 4 hours as needed for mild pain        albuterol 108 (90 Base) MCG/ACT inhaler    PROAIR HFA/PROVENTIL HFA/VENTOLIN HFA    1 Inhaler    Inhale 2 puffs into the lungs every 6 hours as needed for shortness of breath / dyspnea or wheezing (before activity)        cyclobenzaprine 10 MG tablet    FLEXERIL    30 tablet    Take 0.5-1 tablets (5-10 mg) by mouth 3 times daily as needed for muscle spasms    Lumbar sprain, initial encounter       ferrous sulfate 325 (65 Fe) MG tablet    FEROSUL    60 tablet    Take 1 tablet (325 mg) by mouth 2 times daily    Anemia, unspecified type       ibuprofen 200 MG capsule    ADVIL/MOTRIN    60 capsule    Take 200 mg by mouth every 4 hours as needed for fever        levonorgestrel-ethinyl estradiol 0.1-20 MG-MCG tablet    AVIANE/ALESSE/LESSINA    84 tablet    Take 1 tablet by mouth daily Take at first 1 pill every 6 hours until bleeding slows down.    Encounter for surveillance of contraceptive pills       vitamin D3 2000 units tablet    CHOLECALCIFEROL    100 tablet    Take 2,000 Units by mouth daily

## 2023-03-17 NOTE — TELEPHONE ENCOUNTER
Received notice from [facility], where patients referral was sent. Patient was contact twice, 2/27 & 4/14, with messages left both times. They have not been successful in scheduling this patient.     Maribel Ellsworth    
No

## 2023-04-12 NOTE — PROGRESS NOTES
----- Message from Karen Royal sent at 4/12/2023  1:23 PM CDT -----  Type:  Patient Returning Call    Who Called:patient  Who Left Message for Patient:nurse  Does the patient know what this is regarding?:sleep apnea study  Would the patient rather a call back or a response via [employer]? Call back  Best Call Back Number:[phone]  Additional Information: na        OUTPATIENT TREATMENT PLAN SUMMARY    Date of Treatment Plan: 5/17/18   90-Day Review Date: 08/17/18  Date of Initial Service: 5/03/18 (with current provider)      1. DSM-V Diagnosis (include numeric code)  Posttraumatic Stress Disorder (F34.10)  2. Current symptoms and circumstances that substantiate the diagnosis:  Cristiano has a complex and extensive trauma history. She has a difficult time discussing upsetting events from her past and may become tearful or reluctant to discuss. She often has intrusive and distressing memories of events, feels distress when exposed to cues of the event, feels as if she is re-living the event, and experiences physiological reactivity when exposed to trauma cues. Rachel foote reports avoiding cues related to this experience, being unable to recall all the details of the event, and noting diminished interest in activities afterwards (such as going to the park), and feeling detached from others.  She also reported irritability and anger, particularly with her siblings, difficulty concentrating, hypervigilance, and sleep difficulties.    3. How symptoms and/or behaviors are affecting level of function:   Diminished pleasure in activities used to enjoy, difficulty  from mom, difficulty engaging in activities outside of the home. Social anxiety, mistrust of others.    4. Risk Assessment:  Suicide:   Assessed Level of Immediate Risk: None currently  Ideation: Yes  Plan:  No  Means: No  Intent: No  Note: History of suicidal ideation and attempt. No current suicidal ideation, plan, or intent.    Homicide/Violence:  Assessed Level of Immediate Risk: None  Ideation: No  Plan: No  Means: No  Intent: No    If on a medication, please include name and dosage:  No medications currently       Symptom/Problem Measurable Goals Interventions Gains Made   1. PTSD symptoms - avoidance, internalizing Sx 1. In therapeutic setting, will discuss traumatic event and reduce difficult by 50% per patient  report.     Reduce trauma Sx to within mild range on trauma Sx assessment measure (e.g., TSC-40) 1.CBT; Prolonged Exposure 1. None to date   2. Lack of routine, poor sleep 2. Develop consistent routine with increased behavioral activation; routine sleeping schedule. Per patient report 2.CBT: Problem solving, skill building and psychoeducation 2. None to date   3.Poor coping skills (e.g., smoking) 3. Develop positive coping skills to manage negative emotions per patient report. 3.CBT 3. None to date       5. Frequency of Sessions: Weekly    6. Discharge and Aftercare Goals: Cristiano will continue to use skills developed in therapy to manage anxiety, mood, and trauma symptoms. Cristiano may return to therapy if symptoms increase and interfere with functioning.    7. Expected duration of treatment:  6 months    8. Participants in therapy plan (family, friends, support network): Cristiano and Laura Vasquez (psychologist)      See signed  Electronic Consent Form (signed 05/17/18)  for Acknowledgement of Current Treatment Plan      Regulatory Guidelines for Updating Treatment Plan  [place] Medical Assistance: Reviewed & signed at least every 90days  Medicare:  Update per policy

## 2023-05-18 ENCOUNTER — OFFICE VISIT (OUTPATIENT)
Dept: FAMILY MEDICINE | Facility: CLINIC | Age: 23
End: 2023-05-18
Payer: COMMERCIAL

## 2023-05-18 VITALS
HEART RATE: 84 BPM | OXYGEN SATURATION: 98 % | BODY MASS INDEX: 42.1 KG/M2 | HEIGHT: 62 IN | WEIGHT: 228.8 LBS | RESPIRATION RATE: 20 BRPM | TEMPERATURE: 98.1 F | DIASTOLIC BLOOD PRESSURE: 84 MMHG | SYSTOLIC BLOOD PRESSURE: 132 MMHG

## 2023-05-18 DIAGNOSIS — M54.59 MECHANICAL LOW BACK PAIN: ICD-10-CM

## 2023-05-18 DIAGNOSIS — Z12.4 CERVICAL CANCER SCREENING: Primary | ICD-10-CM

## 2023-05-18 DIAGNOSIS — Z00.00 ROUTINE HISTORY AND PHYSICAL EXAMINATION OF ADULT: ICD-10-CM

## 2023-05-18 DIAGNOSIS — F43.10 POSTTRAUMATIC STRESS DISORDER: ICD-10-CM

## 2023-05-18 DIAGNOSIS — J45.20 MILD INTERMITTENT ASTHMA WITHOUT COMPLICATION: ICD-10-CM

## 2023-05-18 DIAGNOSIS — Z30.41 ENCOUNTER FOR SURVEILLANCE OF CONTRACEPTIVE PILLS: ICD-10-CM

## 2023-05-18 DIAGNOSIS — F70 MILD INTELLECTUAL DISABILITY: ICD-10-CM

## 2023-05-18 PROCEDURE — 99395 PREV VISIT EST AGE 18-39: CPT | Mod: GC

## 2023-05-18 RX ORDER — ACETAMINOPHEN AND CODEINE PHOSPHATE 120; 12 MG/5ML; MG/5ML
0.35 SOLUTION ORAL DAILY
Qty: 84 TABLET | Refills: 1 | Status: SHIPPED | OUTPATIENT
Start: 2023-05-18 | End: 2024-08-22

## 2023-05-18 RX ORDER — ALBUTEROL SULFATE 90 UG/1
2 AEROSOL, METERED RESPIRATORY (INHALATION) EVERY 6 HOURS PRN
Qty: 8 G | Refills: 11 | Status: SHIPPED | OUTPATIENT
Start: 2023-05-18 | End: 2024-08-22

## 2023-05-18 RX ORDER — CYCLOBENZAPRINE HCL 10 MG
TABLET ORAL
Qty: 30 TABLET | Refills: 1 | Status: SHIPPED | OUTPATIENT
Start: 2023-05-18

## 2023-05-18 ASSESSMENT — PATIENT HEALTH QUESTIONNAIRE - PHQ9
SUM OF ALL RESPONSES TO PHQ QUESTIONS 1-9: 4
SUM OF ALL RESPONSES TO PHQ QUESTIONS 1-9: 4
10. IF YOU CHECKED OFF ANY PROBLEMS, HOW DIFFICULT HAVE THESE PROBLEMS MADE IT FOR YOU TO DO YOUR WORK, TAKE CARE OF THINGS AT HOME, OR GET ALONG WITH OTHER PEOPLE: NOT DIFFICULT AT ALL

## 2023-05-18 ASSESSMENT — ASTHMA QUESTIONNAIRES: ACT_TOTALSCORE: 12

## 2023-05-18 NOTE — PROGRESS NOTES
SUBJECTIVE:   CC: Rachel Beth is an 23 year old who presents for preventive health visit.       Healthy Habits:   PHQ-2 Total Score: 0    Asthma Follow-Up    Was ACT completed today?  Yes        5/18/2023     1:00 PM   ACT Total Scores   ACT TOTAL SCORE (Goal Greater than or Equal to 20) 12   In the past 12 months, how many times did you visit the emergency room for your asthma without being admitted to the hospital? 0   In the past 12 months, how many times were you hospitalized overnight because of your asthma? 0     How many days per week do you miss taking your asthma controller medication?  7    Please describe any recent triggers for your asthma: None    Have you had any Emergency Room Visits, Urgent Care Visits, or Hospital Admissions since your last office visit?  No      Today's PHQ-2 Score:       5/18/2023    12:50 PM   PHQ-2 ( 1999 Pfizer)   Q1: Little interest or pleasure in doing things 0   Q2: Feeling down, depressed or hopeless 0   PHQ-2 Score 0   Q1: Little interest or pleasure in doing things Not at all   Q2: Feeling down, depressed or hopeless Not at all   PHQ-2 Score 0     Social History     Tobacco Use     Smoking status: Some Days     Types: Cigars     Smokeless tobacco: Never     Tobacco comments:     Mom smokes outside of the house, most of the time    Vaping Use     Vaping status: Not on file   Substance Use Topics     Alcohol use: No     History of abnormal Pap smear:      Reviewed and updated as needed this visit by clinical staff   Tobacco  Allergies  Meds  Problems  Med Hx          Reviewed and updated as needed this visit by Provider      Problems            Past Medical History:   Diagnosis Date     Mild intermittent asthma without complication 5/18/2023      No past surgical history on file.  OB History   No obstetric history on file.       Review of Systems  CONSTITUTIONAL: NEGATIVE for fever, chills, change in weight  INTEGUMENTARU/SKIN: NEGATIVE for worrisome rashes, moles or  "lesions  EYES: NEGATIVE for vision changes or irritation  ENT: NEGATIVE for ear, mouth and throat problems  RESP: NEGATIVE for significant cough or SOB  BREAST: NEGATIVE for masses, tenderness or discharge  CV: NEGATIVE for chest pain, palpitations or peripheral edema  GI: NEGATIVE for nausea, abdominal pain, heartburn, or change in bowel habits  : NEGATIVE for unusual urinary or vaginal symptoms. Periods are regular.  MUSCULOSKELETAL: NEGATIVE for significant arthralgias or myalgia  NEURO: NEGATIVE for weakness, dizziness or paresthesias  PSYCHIATRIC: NEGATIVE for changes in mood or affect     OBJECTIVE:   /84   Pulse 84   Temp 98.1  F (36.7  C) (Oral)   Resp 20   Ht 1.573 m (5' 1.93\")   Wt 103.8 kg (228 lb 12.8 oz)   LMP 05/12/2023 (Exact Date)   SpO2 98%   BMI 41.94 kg/m    Physical Exam  GENERAL: obese, alert and no distress  EYES: Eyes grossly normal to inspection, PERRL and conjunctivae and sclerae normal  HENT: ear canals and TM's normal, nose and mouth without ulcers or lesions  NECK: no adenopathy, no asymmetry, masses, or scars and thyroid normal to palpation  RESP: lungs clear to auscultation - no rales, rhonchi or wheezes  BREAST: normal without masses, tenderness or nipple discharge and no palpable axillary masses or adenopathy  CV: regular rate and rhythm, normal S1 S2, no S3 or S4, no murmur, click or rub, no peripheral edema and peripheral pulses strong  ABDOMEN: soft, nontender, no hepatosplenomegaly, no masses and bowel sounds normal  MS: no gross musculoskeletal defects noted, no edema  SKIN: no suspicious lesions or rashes  NEURO: Normal strength and tone, mentation intact and speech normal  PSYCH: mentation appears normal, affect normal/bright    Diagnostic Test Results:  Labs reviewed in Epic    ASSESSMENT/PLAN:   Rachel Beth was seen today for physical, forms and refill request.    Diagnoses and all orders for this visit:    Routine history and physical examination of " adult  Routine physical exam and filling out a form.  Reviewed healthy habits as discussed below.    Cervical cancer screening  Encouraged Pap today, patient declined.  Would like mom to be here with her for Pap.  May be open to at next visit.    Mild intermittent asthma without complication  Reports poor control, however has not been taking her inhalers.  Refilled those and made sure to recommend that she stay consistent with her daily inhaler.  We will recheck next visit.  -     albuterol (PROAIR HFA/PROVENTIL HFA/VENTOLIN HFA) 108 (90 Base) MCG/ACT inhaler; Inhale 2 puffs into the lungs every 6 hours as needed for shortness of breath or wheezing (before activity)  -     beclomethasone HFA (QVAR REDIHALER) 40 MCG/ACT inhaler; Inhale 1 puff into the lungs 2 times daily    Mechanical low back pain  Continues to have occasional spasms.  Reports Flexeril improves this.  Refill.  -     cyclobenzaprine (FLEXERIL) 10 MG tablet; TAKE 1/2 TO 1 TABLET(5 TO 10 MG) BY MOUTH THREE TIMES DAILY AS NEEDED FOR MUSCLE SPASMS    Encounter for surveillance of contraceptive pills  Refill.  -     norethindrone (MICRONOR) 0.35 MG tablet; Take 1 tablet (0.35 mg) by mouth daily    Mild intellectual disability  Posttraumatic stress disorder  Filled out form for patient to receive disability benefits.  Per 2019 neuropsych testing patient with mild cognitive disability and PTSD.  They report that it would be difficult for her to work but may be able to work in part time with lots of support.  Her sister is work with anonymous person before, they will reach out again for any support.  Otherwise filled out form to the best of my knowledge.  Recommended that they continue to follow with psychiatry.      COUNSELING:       Regular exercise       Healthy diet/nutrition      She reports that she has been smoking cigars. She has never used smokeless tobacco.  Nicotine/Tobacco Cessation Plan:   Information offered: Patient not interested at this  nelia Bragg, [facility]  Answers for HPI/ROS submitted by the patient on 5/18/2023  If you checked off any problems, how difficult have these problems made it for you to do your work, take care of things at home, or get along with other people?: Not difficult at all  PHQ9 TOTAL SCORE: 4

## 2023-05-18 NOTE — PROGRESS NOTES
Preceptor Attestation:   Patient seen, evaluated and discussed with the resident. I have verified the content of the note, which accurately reflects my assessment of the patient and the plan of care.   Supervising Physician:  Migel Pollock MD

## 2023-05-19 ENCOUNTER — TELEPHONE (OUTPATIENT)
Dept: FAMILY MEDICINE | Facility: CLINIC | Age: 23
End: 2023-05-19
Payer: COMMERCIAL

## 2023-05-19 DIAGNOSIS — J45.20 MILD INTERMITTENT ASTHMA WITHOUT COMPLICATION: Primary | ICD-10-CM

## 2023-05-19 NOTE — TELEPHONE ENCOUNTER
PRIOR AUTHORIZATION DENIED    Medication: BECLOMETHASONE DIPROP HFA 40 MCG/ACT IN AERB  Insurance Company: [employer] - Phone [phone] Fax [phone]  Denial Date: 5/18/2023  Denial Rational:     Appeal Information:     Patient Notified: No

## 2023-05-23 NOTE — CONFIDENTIAL NOTE
Drug Change Request:     beclomethasone HFA (QVAR REDIHALER) 40 MCG/ACT inhaler  PA was denied - check in earlier note for more details.    Pharmacy sent an alternative list of medication:    ASMANES, BUDESONIDE, DULERA, FLOVENT HFA, PULMICORT FLEX HALER, SYMBICORT.     Pharmacy - [address] [id number]   Phone number: [phone]  Fax number: [phone]    Please advise, Thank you.    KELSEY Conroy MA

## 2023-05-26 RX ORDER — BUDESONIDE AND FORMOTEROL FUMARATE DIHYDRATE 80; 4.5 UG/1; UG/1
AEROSOL RESPIRATORY (INHALATION)
Qty: 20.4 G | Refills: 11 | Status: SHIPPED | OUTPATIENT
Start: 2023-05-26

## 2023-10-13 ENCOUNTER — OFFICE VISIT (OUTPATIENT)
Dept: FAMILY MEDICINE | Facility: CLINIC | Age: 23
End: 2023-10-13
Payer: COMMERCIAL

## 2023-10-13 VITALS
TEMPERATURE: 97.3 F | BODY MASS INDEX: 45.1 KG/M2 | SYSTOLIC BLOOD PRESSURE: 121 MMHG | HEART RATE: 83 BPM | DIASTOLIC BLOOD PRESSURE: 81 MMHG | OXYGEN SATURATION: 97 % | WEIGHT: 246 LBS

## 2023-10-13 DIAGNOSIS — Z02.89 ENCOUNTER FOR COMPLETION OF FORM WITH PATIENT: Primary | ICD-10-CM

## 2023-10-13 PROCEDURE — 99213 OFFICE O/P EST LOW 20 MIN: CPT | Mod: GC

## 2023-10-13 ASSESSMENT — ASTHMA QUESTIONNAIRES
QUESTION_1 LAST FOUR WEEKS HOW MUCH OF THE TIME DID YOUR ASTHMA KEEP YOU FROM GETTING AS MUCH DONE AT WORK, SCHOOL OR AT HOME: SOME OF THE TIME
ACT_TOTALSCORE: 15
QUESTION_4 LAST FOUR WEEKS HOW OFTEN HAVE YOU USED YOUR RESCUE INHALER OR NEBULIZER MEDICATION (SUCH AS ALBUTEROL): TWO OR THREE TIMES PER WEEK
QUESTION_3 LAST FOUR WEEKS HOW OFTEN DID YOUR ASTHMA SYMPTOMS (WHEEZING, COUGHING, SHORTNESS OF BREATH, CHEST TIGHTNESS OR PAIN) WAKE YOU UP AT NIGHT OR EARLIER THAN USUAL IN THE MORNING: ONCE OR TWICE
QUESTION_2 LAST FOUR WEEKS HOW OFTEN HAVE YOU HAD SHORTNESS OF BREATH: MORE THAN ONCE A DAY
QUESTION_5 LAST FOUR WEEKS HOW WOULD YOU RATE YOUR ASTHMA CONTROL: WELL CONTROLLED
ACT_TOTALSCORE: 15

## 2023-10-13 NOTE — PROGRESS NOTES
Preceptor Attestation:    I discussed the patient with the resident and evaluated the patient in person. I have verified the content of the note, which accurately reflects my assessment of the patient and the plan of care.   Supervising Physician:  Russ Solis MD.

## 2023-10-13 NOTE — PROGRESS NOTES
"  Assessment & Plan     Encounter for completion of form with patient  Has documented intellectual disability, reports learning disability. Has hx of PTSD.  - Forms completed and returned to patient       Health Maintenance   - Declined COVID, pneumococcal or Flu vaccine  - Recommended f/up for PAP     BMI:   Estimated body mass index is 45.1 kg/m  as calculated from the following:    Height as of 5/18/23: 1.573 m (5' 1.93\").    Weight as of this encounter: 111.6 kg (246 lb).       No follow-ups on file.    Kirstin Dominguez MD  [facility]    Yesi Beth is a 23 year old, presenting for the following health issues:  Forms ([employer] forms- EBT )        10/13/2023     2:37 PM   Additional Questions   Roomed by mireles   Accompanied by self       HPI   Doing well. No concerns. She is following up today for completion of [facility] form for disability services. This ws last completed in June 2023. She has had no changes to her health. She endorses hx of PTSD and mild cognitive/intellectual impairment that make learning and learning new tasks difficulty for her. She also has increased anxiety and stress in new situations related to her PTSD.       Review of Systems   Constitutional, HEENT, cardiovascular, pulmonary, gi and gu systems are negative, except as otherwise noted.      Objective    /81 (BP Location: Right arm, Patient Position: Sitting, Cuff Size: Adult Large)   Pulse 83   Temp 97.3  F (36.3  C) (Tympanic)   Wt 111.6 kg (246 lb)   SpO2 97%   BMI 45.10 kg/m    Body mass index is 45.1 kg/m .  Physical Exam   GENERAL: healthy, alert and no distress  RESP: Regular respiratory rate, effort  MS: no gross musculoskeletal defects noted, no edema  PSYCH: mentation appears normal, affect normal/bright    I precepted today with Dr Solis.    Taty Dominguez MD PGY-3              "

## 2024-07-03 ENCOUNTER — TELEPHONE (OUTPATIENT)
Dept: FAMILY MEDICINE | Facility: CLINIC | Age: 24
End: 2024-07-03
Payer: COMMERCIAL

## 2024-07-03 NOTE — TELEPHONE ENCOUNTER
Patient Quality Outreach    Patient is due for the following:   Asthma  -  Asthma follow-up visit    Next Steps:   Schedule a office visit for asthma follow up     Type of outreach:    Phone, left message for patient/parent to call back.      Questions for provider review:    None           Carrie Conroy MA

## 2024-07-03 NOTE — LETTER
July 11, 2024    To  Rachel VERGARA [place] [id number] [place] [address]    Your team at [facility] cares about your health. We have reviewed your chart and based on our findings; we are making the following recommendations to better manage your health.     You are in particular need of attention regarding the following:     Asthma Control Test     This screening tool helps us to assess how well your asthma is controlled.Good asthma control leads to fewer asthma symptoms and greater health. If your asthma is not in good control (score is 19 or less) or you have been to the ER or urgent care for your asthma, it is recommended you be seen by your provider for medication and lifestyle adjustments.      Please complete and return the attached Asthma Control Test respond below with you answers for each question: Adult ACT     This is valuable information that is requested by your Care Team.      If you have already completed these items, please contact the clinic via phone or   [facility] so your care team can review and update your records. Thank you for   choosing [facility] for your healthcare needs. For any questions,   concerns, or to schedule an appointment please contact our clinic.    Healthy Regards,      Your [facility] Care Team

## 2024-07-11 NOTE — TELEPHONE ENCOUNTER
2nd attempt- No answer. LVM to return clinic call back. Letter mailed out.    YIFAN Zavala 3:14 PM July 11, 2024

## 2024-08-01 NOTE — PATIENT INSTRUCTIONS
Holter Monitor 48 hour - Adult   January 28, 2019 at 10:05 am Demographics, referral, office notes, and medication list faxed to [facility] at [phone] who will contact patient for scheduling. [facility]  Phone: [phone]  Fax: [phone]      
Average build

## 2024-08-22 ENCOUNTER — OFFICE VISIT (OUTPATIENT)
Dept: FAMILY MEDICINE | Facility: CLINIC | Age: 24
End: 2024-08-22
Payer: COMMERCIAL

## 2024-08-22 ENCOUNTER — TELEPHONE (OUTPATIENT)
Dept: PHARMACY | Facility: CLINIC | Age: 24
End: 2024-08-22

## 2024-08-22 VITALS
TEMPERATURE: 97.4 F | OXYGEN SATURATION: 99 % | HEART RATE: 96 BPM | RESPIRATION RATE: 20 BRPM | HEIGHT: 62 IN | BODY MASS INDEX: 47.22 KG/M2 | WEIGHT: 256.6 LBS | SYSTOLIC BLOOD PRESSURE: 122 MMHG | DIASTOLIC BLOOD PRESSURE: 85 MMHG

## 2024-08-22 DIAGNOSIS — Z12.4 CERVICAL CANCER SCREENING: ICD-10-CM

## 2024-08-22 DIAGNOSIS — Z00.00 ROUTINE GENERAL MEDICAL EXAMINATION AT A HEALTH CARE FACILITY: ICD-10-CM

## 2024-08-22 DIAGNOSIS — Z65.3 PROBLEMS RELATED TO OTHER LEGAL CIRCUMSTANCES: ICD-10-CM

## 2024-08-22 DIAGNOSIS — Z13.9 ENCOUNTER FOR SCREENING INVOLVING SOCIAL DETERMINANTS OF HEALTH (SDOH): Primary | ICD-10-CM

## 2024-08-22 DIAGNOSIS — J45.20 MILD INTERMITTENT ASTHMA WITHOUT COMPLICATION: ICD-10-CM

## 2024-08-22 DIAGNOSIS — Z30.41 ENCOUNTER FOR SURVEILLANCE OF CONTRACEPTIVE PILLS: ICD-10-CM

## 2024-08-22 DIAGNOSIS — R73.09 ELEVATED GLUCOSE: ICD-10-CM

## 2024-08-22 DIAGNOSIS — L30.4 INTERTRIGO: ICD-10-CM

## 2024-08-22 LAB — HBA1C MFR BLD: 5.5 % (ref 0–5.6)

## 2024-08-22 PROCEDURE — 90471 IMMUNIZATION ADMIN: CPT | Performed by: FAMILY MEDICINE

## 2024-08-22 PROCEDURE — 83036 HEMOGLOBIN GLYCOSYLATED A1C: CPT | Performed by: FAMILY MEDICINE

## 2024-08-22 PROCEDURE — 90677 PCV20 VACCINE IM: CPT | Performed by: FAMILY MEDICINE

## 2024-08-22 PROCEDURE — 36415 COLL VENOUS BLD VENIPUNCTURE: CPT | Performed by: FAMILY MEDICINE

## 2024-08-22 PROCEDURE — 99395 PREV VISIT EST AGE 18-39: CPT | Mod: 25 | Performed by: FAMILY MEDICINE

## 2024-08-22 RX ORDER — ACETAMINOPHEN AND CODEINE PHOSPHATE 120; 12 MG/5ML; MG/5ML
0.35 SOLUTION ORAL DAILY
Qty: 84 TABLET | Refills: 1 | Status: SHIPPED | OUTPATIENT
Start: 2024-08-22

## 2024-08-22 RX ORDER — NYSTATIN 100000 [USP'U]/G
POWDER TOPICAL 2 TIMES DAILY PRN
Qty: 60 G | Refills: 1 | Status: SHIPPED | OUTPATIENT
Start: 2024-08-22

## 2024-08-22 RX ORDER — ALBUTEROL SULFATE 90 UG/1
2 AEROSOL, METERED RESPIRATORY (INHALATION) EVERY 6 HOURS PRN
Qty: 8 G | Refills: 11 | Status: SHIPPED | OUTPATIENT
Start: 2024-08-22

## 2024-08-22 RX ORDER — BUDESONIDE AND FORMOTEROL FUMARATE DIHYDRATE 160; 4.5 UG/1; UG/1
AEROSOL RESPIRATORY (INHALATION)
Qty: 20.4 G | Refills: 11 | Status: SHIPPED | OUTPATIENT
Start: 2024-08-22

## 2024-08-22 RX ORDER — BUDESONIDE AND FORMOTEROL FUMARATE DIHYDRATE 80; 4.5 UG/1; UG/1
AEROSOL RESPIRATORY (INHALATION)
Qty: 20.4 G | Refills: 11 | Status: CANCELLED | OUTPATIENT
Start: 2024-08-22

## 2024-08-22 SDOH — HEALTH STABILITY: PHYSICAL HEALTH: ON AVERAGE, HOW MANY DAYS PER WEEK DO YOU ENGAGE IN MODERATE TO STRENUOUS EXERCISE (LIKE A BRISK WALK)?: 7 DAYS

## 2024-08-22 SDOH — HEALTH STABILITY: PHYSICAL HEALTH: ON AVERAGE, HOW MANY MINUTES DO YOU ENGAGE IN EXERCISE AT THIS LEVEL?: 10 MIN

## 2024-08-22 ASSESSMENT — ASTHMA QUESTIONNAIRES
QUESTION_1 LAST FOUR WEEKS HOW MUCH OF THE TIME DID YOUR ASTHMA KEEP YOU FROM GETTING AS MUCH DONE AT WORK, SCHOOL OR AT HOME: SOME OF THE TIME
QUESTION_3 LAST FOUR WEEKS HOW OFTEN DID YOUR ASTHMA SYMPTOMS (WHEEZING, COUGHING, SHORTNESS OF BREATH, CHEST TIGHTNESS OR PAIN) WAKE YOU UP AT NIGHT OR EARLIER THAN USUAL IN THE MORNING: ONCE A WEEK
ACT_TOTALSCORE: 16
QUESTION_2 LAST FOUR WEEKS HOW OFTEN HAVE YOU HAD SHORTNESS OF BREATH: ONCE A DAY
ACT_TOTALSCORE: 16
QUESTION_4 LAST FOUR WEEKS HOW OFTEN HAVE YOU USED YOUR RESCUE INHALER OR NEBULIZER MEDICATION (SUCH AS ALBUTEROL): ONCE A WEEK OR LESS
QUESTION_5 LAST FOUR WEEKS HOW WOULD YOU RATE YOUR ASTHMA CONTROL: WELL CONTROLLED

## 2024-08-22 ASSESSMENT — SOCIAL DETERMINANTS OF HEALTH (SDOH): HOW OFTEN DO YOU GET TOGETHER WITH FRIENDS OR RELATIVES?: NEVER

## 2024-08-22 NOTE — LETTER
August 27, 2024      Rachel Beth [place]  [address] [place] [address]        Dear Ms. Calixto,     Your A1c was normal.  No diabetes.     Deb Wood MD     Resulted Orders   Hemoglobin A1c   Result Value Ref Range    Hemoglobin A1C 5.5 0.0 - 5.6 %      Comment:      Normal <5.7%   Prediabetes 5.7-6.4%    Diabetes 6.5% or higher     Note: Adopted from ADA consensus guidelines.       If you have any questions or concerns, please call the clinic at the number listed above.       Sincerely,      Deb Wood MD

## 2024-08-22 NOTE — PROGRESS NOTES
"Preventive Care Visit  [facility]  Deb Wood MD, Family Medicine  Aug 22, 2024    Assessment & Plan     Routine general medical examination at a health care facility  24 year old female presenting for preventative visit.    Cancer screening:  -- Cervical:  No previous screening.  Declined Pap smear today.    Disease screening:  -- Diabetes screening: A1c normal (5.2%), 6/23/21.  Family history of diabetes.  Re-check A1c today.  -- Lipid screening: LDL 97 6/23/21.  -- HIV screening: Negative 1/15/20.  -- Hep C screening: Negative 6/23/21.    Immunizations:  -- TDaP: Up-to-date (1/15/20).  -- Pneumococcal: Recommend PCV20 today.  Given.  -- Flu: Recommend annually during flu season.  -- COVID: Offered primary immunization when it's released - plans to hold off.    Other:  -- Asthma.  Refilled Symbicort.  --Menorrhagia.  Refilled norethindrone.  -- Intertrigo.  Prescribed nystatin powder.    Nicotine/Tobacco Cessation  She reports that she has been smoking cigars. She has never used smokeless tobacco.    Nicotine/Tobacco Cessation Plan  Information offered: Patient not interested at this time      BMI  Estimated body mass index is 47.04 kg/m  as calculated from the following:    Height as of this encounter: 1.573 m (5' 1.93\").    Weight as of this encounter: 116.4 kg (256 lb 9.6 oz).     Counseling  Appropriate preventive services were addressed with this patient via screening, questionnaire, or discussion as appropriate for fall prevention, nutrition, physical activity, Tobacco-use cessation, social engagement, weight loss and cognition.  Checklist reviewing preventive services available has been given to the patient.  Reviewed patient's diet, addressing concerns and/or questions.   Patient is at risk for social isolation and has been provided with information about the benefit of social connection.   The patient reports drinking more than 3 alcoholic drinks per day and/or more than 7 " drhnks per week. The patient was counseled and given information about possible harmful effects of excessive alcohol intake.    Return in about 53 weeks (around 8/28/2025) for Annual Wellness Visit.    Subjective   Rachel Beth is a 24 year old, presenting for the following:  Physical        8/22/2024     3:12 PM   Additional Questions   Roomed by    Accompanied by family         8/22/2024    Information    services provided? No         Health Care Directive  Patient does not have a Health Care Directive or Living Will.    HPI  Here for complete physical.  No concerns.      8/22/2024   General Health   How would you rate your overall physical health? Excellent   Feel stress (tense, anxious, or unable to sleep) To some extent      (!) STRESS CONCERN      8/22/2024   Nutrition   Three or more servings of calcium each day? Yes   Diet: I don't know   How many servings of fruit and vegetables per day? 4 or more   How many sweetened beverages each day? (!) 4+           8/22/2024   Exercise   Days per week of moderate/strenous exercise 7 days   Average minutes spent exercising at this level 10 min            8/22/2024   Social Factors   Frequency of gathering with friends or relatives Never   Worry food won't last until get money to buy more No   Food not last or not have enough money for food? No   Do you have housing? (Housing is defined as stable permanent housing and does not include staying ouside in a car, in a tent, in an abandoned building, in an overnight shelter, or couch-surfing.) Yes   Are you worried about losing your housing? No   Lack of transportation? Yes   Unable to get utilities (heat,electricity)? No   Want help with housing or utility concern? (!) YES       (!) TRANSPORTATION CONCERN PRESENT(!) FINANCIAL RESOURCE STRAIN CONCERN(!) SOCIAL CONNECTIONS CONCERN      8/22/2024   Dental   Dentist two times every year? Yes          8/22/2024   TB Screening   Were you born outside of the [place]?  Yes      Today's PHQ-2 Score:       8/22/2024     3:13 PM   PHQ-2 ( 1999 Pfizer)   Q1: Little interest or pleasure in doing things 0   Q2: Feeling down, depressed or hopeless 0   PHQ-2 Score 0           8/22/2024   Substance Use   Alcohol more than 3/day or more than 7/wk Yes   How often do you have a drink containing alcohol 2 to 4 times a month   How many alcohol drinks on typical day 5 or 6   How often do you have 5+ drinks at one occasion Monthly   Audit 2/3 Score 4   How often not able to stop drinking once started Weekly   How often failed to do what normally expected Weekly   How often needed first drink in am after a heavy drinking session Less than monthly   How often feeling of guilt or remorse after drinking Less than monthly   How often unable to remember what happened the night before Less than monthly   Have you or someone else been injured because of your drinking No   Has anyone been concerned or suggested you cut down on drinking No   TOTAL SCORE - AUDIT 15   Do you use any other substances recreationally? No       Social History     Tobacco Use    Smoking status: Some Days     Types: Cigars    Smokeless tobacco: Never    Tobacco comments:     Mom smokes outside of the house, most of the time    Substance Use Topics    Alcohol use: No    Drug use: No         8/22/2024   STI Screening   New sexual partner(s) since last STI/HIV test? (!) DECLINE           8/22/2024   Contraception/Family Planning   Questions about contraception or family planning (!) DECLINE      Reviewed and updated as needed this visit by Provider   Tobacco  Allergies  Meds  Problems  Med Hx  Surg Hx  Fam Hx            BP Readings from Last 3 Encounters:   08/22/24 122/85   10/13/23 121/81   05/18/23 132/84    Wt Readings from Last 3 Encounters:   08/22/24 116.4 kg (256 lb 9.6 oz)   10/13/23 111.6 kg (246 lb)   05/18/23 103.8 kg (228 lb 12.8 oz)                  Patient Active Problem List   Diagnosis    Migraine    Vitamin D  deficiency    Heart murmur    Posttraumatic stress disorder    Mild intellectual disability    Mild intermittent asthma without complication     History reviewed. No pertinent surgical history.    Social History     Tobacco Use    Smoking status: Some Days     Types: Cigars    Smokeless tobacco: Never    Tobacco comments:     Mom smokes outside of the house, most of the time    Substance Use Topics    Alcohol use: No     Family History   Problem Relation Age of Onset    Heart Disease Mother     Diabetes Father     Cancer No family hx of          Current Outpatient Medications   Medication Sig Dispense Refill    acetaminophen (TYLENOL) 325 MG tablet TAKE 2 TABLETS(650 MG) BY MOUTH EVERY 4 HOURS AS NEEDED FOR MILD PAIN 100 tablet 0    albuterol (PROAIR HFA/PROVENTIL HFA/VENTOLIN HFA) 108 (90 Base) MCG/ACT inhaler Inhale 2 puffs into the lungs every 6 hours as needed for shortness of breath or wheezing (before activity). 8 g 11    beclomethasone HFA (QVAR REDIHALER) 40 MCG/ACT inhaler Inhale 1 puff into the lungs 2 times daily 10.6 g 11    budesonide-formoterol (SYMBICORT) 160-4.5 MCG/ACT Inhaler Inhale 2 puffs twice daily plus 1-2 puffs as needed. May use up to 12 puffs per day. 20.4 g 11    budesonide-formoterol (SYMBICORT) 80-4.5 MCG/ACT Inhaler Inhale 1 puff twice daily plus 1-2 puffs as needed. May use up to 12 puffs per day. 20.4 g 11    Cholecalciferol (VITAMIN D3) 50 MCG (2000 UT) CAPS TAKE 1 CAPSULE BY MOUTH EVERY  capsule 3    cyclobenzaprine (FLEXERIL) 10 MG tablet TAKE 1/2 TO 1 TABLET(5 TO 10 MG) BY MOUTH THREE TIMES DAILY AS NEEDED FOR MUSCLE SPASMS 30 tablet 1    norethindrone (MICRONOR) 0.35 MG tablet Take 1 tablet (0.35 mg) by mouth daily. 84 tablet 1    nystatin (MYCOSTATIN) 378115 UNIT/GM external powder Apply topically 2 times daily as needed. 60 g 1     Allergies   Allergen Reactions    Nkda [No Known Drug Allergy]      Recent Labs   Lab Test 08/22/24  1609 06/23/21  1425 01/15/20  1009  "04/04/19  1031   A1C 5.5 5.2  --  5.5   LDL  --  97  --   --    HDL  --  27.6*  --   --    TRIG  --  102.7  --   --    ALT  --   --  <15  --    CR  --   --  0.7  --    GFRESTIMATED  --   --  >90  --    GFRESTBLACK  --   --  >90  --    POTASSIUM  --   --  3.5  --       Objective    Exam  /85   Pulse 96   Temp 97.4  F (36.3  C) (Tympanic)   Resp 20   Ht 1.573 m (5' 1.93\")   Wt 116.4 kg (256 lb 9.6 oz)   LMP 08/20/2024 (Approximate)   SpO2 99%   BMI 47.04 kg/m     Estimated body mass index is 47.04 kg/m  as calculated from the following:    Height as of this encounter: 1.573 m (5' 1.93\").    Weight as of this encounter: 116.4 kg (256 lb 9.6 oz).    Physical Exam  GENERAL: alert and no distress  EYES: Eyes grossly normal to inspection, PERRL and conjunctivae and sclerae normal  HENT: ear canals and TM's normal, nose and mouth without ulcers or lesions  NECK: no adenopathy, no asymmetry, masses, or scars  RESP: lungs clear to auscultation - no rales, rhonchi or wheezes  CV: regular rate and rhythm, normal S1 S2, no S3 or S4, no murmur, click or rub, no peripheral edema  ABDOMEN: soft, nontender, no hepatosplenomegaly, no masses and bowel sounds normal  MS: no gross musculoskeletal defects noted, no edema  PSYCH: mentation appears normal, affect normal/bright    Signed Electronically by: Deb Wood MD    "

## 2024-08-22 NOTE — PROGRESS NOTES
Prior to immunization administration, verified patients identity using patient s name and date of birth. Please see Immunization Activity for additional information.     Screening Questionnaire for Adult Immunization    Are you sick today?   No   Do you have allergies to medications, food, a vaccine component or latex?   No   Have you ever had a serious reaction after receiving a vaccination?   No   Do you have a long-term health problem with heart, lung, kidney, or metabolic disease (e.g., diabetes), asthma, a blood disorder, no spleen, complement component deficiency, a cochlear implant, or a spinal fluid leak?  Are you on long-term aspirin therapy?   No   Do you have cancer, leukemia, HIV/AIDS, or any other immune system problem?   No   Do you have a parent, brother, or sister with an immune system problem?   No   In the past 3 months, have you taken medications that affect  your immune system, such as prednisone, other steroids, or anticancer drugs; drugs for the treatment of rheumatoid arthritis, Crohn s disease, or psoriasis; or have you had radiation treatments?   No   Have you had a seizure, or a brain or other nervous system problem?   No   During the past year, have you received a transfusion of blood or blood    products, or been given immune (gamma) globulin or antiviral drug?   No   For women: Are you pregnant or is there a chance you could become       pregnant during the next month?   No   Have you received any vaccinations in the past 4 weeks?   No     Immunization questionnaire answers were all negative.      Patient instructed to remain in clinic for 15 minutes afterwards, and to report any adverse reactions.     Screening performed by VASU COLLIER MA on 8/22/2024 at 4:06 PM.

## 2024-08-22 NOTE — TELEPHONE ENCOUNTER
Care Coordination: 8/22/2024    This patient was presented to Care Coordination with a legal request due to an sliding scale insulin denial. I have e-mailed the clinics legal department.      Yo Lindsey Sr.   Care Coordination  [address] [address]  [email or web address]   Office: [phone] Direct: [phone]  [facility]

## 2024-08-22 NOTE — PATIENT INSTRUCTIONS
Patient Education   Preventive Care Advice   This is general advice given by our system to help you stay healthy. However, your care team may have specific advice just for you. Please talk to your care team about your preventive care needs.  Nutrition  Eat 5 or more servings of fruits and vegetables each day.  Try wheat bread, brown rice and whole grain pasta (instead of white bread, rice, and pasta).  Get enough calcium and vitamin D. Check the label on foods and aim for 100% of the RDA (recommended daily allowance).  Lifestyle  Exercise at least 150 minutes each week  (30 minutes a day, 5 days a week).  Do muscle strengthening activities 2 days a week. These help control your weight and prevent disease.  No smoking.  Wear sunscreen to prevent skin cancer.  Have a dental exam and cleaning every 6 months.  Yearly exams  See your health care team every year to talk about:  Any changes in your health.  Any medicines your care team has prescribed.  Preventive care, family planning, and ways to prevent chronic diseases.  Shots (vaccines)   HPV shots (up to age 26), if you've never had them before.  Hepatitis B shots (up to age 59), if you've never had them before.  COVID-19 shot: Get this shot when it's due.  Flu shot: Get a flu shot every year.  Tetanus shot: Get a tetanus shot every 10 years.  Pneumococcal, hepatitis A, and RSV shots: Ask your care team if you need these based on your risk.  Shingles shot (for age 50 and up)  General health tests  Diabetes screening:  Starting at age 35, Get screened for diabetes at least every 3 years.  If you are younger than age 35, ask your care team if you should be screened for diabetes.  Cholesterol test: At age 39, start having a cholesterol test every 5 years, or more often if advised.  Bone density scan (DEXA): At age 50, ask your care team if you should have this scan for osteoporosis (brittle bones).  Hepatitis C: Get tested at least once in your life.  STIs (sexually  transmitted infections)  Before age 24: Ask your care team if you should be screened for STIs.  After age 24: Get screened for STIs if you're at risk. You are at risk for STIs (including HIV) if:  You are sexually active with more than one person.  You don't use condoms every time.  You or a partner was diagnosed with a sexually transmitted infection.  If you are at risk for HIV, ask about PrEP medicine to prevent HIV.  Get tested for HIV at least once in your life, whether you are at risk for HIV or not.  Cancer screening tests  Cervical cancer screening: If you have a cervix, begin getting regular cervical cancer screening tests starting at age 21.  Breast cancer scan (mammogram): If you've ever had breasts, begin having regular mammograms starting at age 40. This is a scan to check for breast cancer.  Colon cancer screening: It is important to start screening for colon cancer at age 45.  Have a colonoscopy test every 10 years (or more often if you're at risk) Or, ask your provider about stool tests like a FIT test every year or Cologuard test every 3 years.  To learn more about your testing options, visit:   .  For help making a decision, visit:   https://bit.ly/ot61802.  Prostate cancer screening test: If you have a prostate, ask your care team if a prostate cancer screening test (PSA) at age 55 is right for you.  Lung cancer screening: If you are a current or former smoker ages 50 to 80, ask your care team if ongoing lung cancer screenings are right for you.  For informational purposes only. Not to replace the advice of your health care provider. Copyright   2023 [facility]. All rights reserved. Clinically reviewed by the [facility]. [employer] [facility] 326752 - REV 01/24.  9 Ways to Cut Back on Drinking  Maybe you've found yourself drinking more alcohol than you'd prefer. If you want to cut back, here are some ideas to try.    Think before you drink.  Do you really want a drink,  "or is it just a habit? If you're used to having a drink at a certain time, try doing something else then.     Look for substitutes.  Find some no-alcohol drinks that you enjoy, like flavored seltzer water, tea with honey, or tonic with a slice of lime. Or try alcohol-free beer or \"virgin\" cocktails (without the alcohol).     Drink more water.  Use water to quench your thirst. Drink a glass of water before you have any alcohol. Have another glass along with every drink or between drinks.     Shrink your drink.  For example, have a bottle of beer instead of a pint. Use a smaller glass for wine. Choose drinks with lower alcohol content (ABV%). Or use less liquor and more mixer in cocktails.     Slow down.  It's easy to drink quickly and without thinking about it. Pay attention, and make each drink last longer.     Do the math.  Total up how much you spend on alcohol each month. How much is that a year? If you cut back, what could you do with the money you save?     Take a break.  Choose a day or two each week when you won't drink at all. Notice how you feel on those days, physically and emotionally. How did you sleep? Do you feel better? Over time, add more break days.     Count calories.  Would you like to lose some weight? For some people that's a good motivator for cutting back. Figure out how many calories are in each drink. How many does that add up to in a day? In a week? In a month?     Practice saying no.  Be ready when someone offers you a drink. Try: \"Thanks, I've had enough.\" Or \"Thanks, but I'm cutting back.\" Or \"No, thanks. I feel better when I drink less.\"   Current as of: November 15, 2023  Content Version: 14.1 2006-2024 [employer].   Care instructions adapted under license by your healthcare professional. If you have questions about a medical condition or this instruction, always ask your healthcare professional. [employer] disclaims any warranty or liability for your use " of this information.

## 2024-08-28 ENCOUNTER — TELEPHONE (OUTPATIENT)
Dept: FAMILY MEDICINE | Facility: CLINIC | Age: 24
End: 2024-08-28
Payer: COMMERCIAL

## 2024-08-28 NOTE — TELEPHONE ENCOUNTER
Forms/Letter Request    Type of form/letter: Medical opinion      Do we have the form/letter: Yes:     Who is the form from? Patient    Where did/will the form come from? Patient or family brought in       When is form/letter needed by: 08/30/2024    How would you like the form/letter returned: Fax : [phone] IESHA KIMBROUGH    Patient Notified form requests are processed in 5-7 business days:Yes    Okay to leave a detailed message?: Yes at Other phone number:  [phone]

## 2024-08-29 NOTE — TELEPHONE ENCOUNTER
To be completed in Nursing note:    Please reference list for forms that require a visit for completion.  Please remind patients that providers are given 3-5 business days to complete and return forms.      Form type: medical opinion form     Date form received: 8/29/2024    Date form completed by Physician:     How was form returned to patient (mailed, faxed, or at  for patient to ):    Fax to     Date form mailed/faxed/left at  for patient and sent to HIM for scanning:    Fax on     Once form is left for patient, faxed, or mailed PCS will then close the documentation only encounter.

## 2024-11-08 ENCOUNTER — OFFICE VISIT (OUTPATIENT)
Dept: FAMILY MEDICINE | Facility: CLINIC | Age: 24
End: 2024-11-08
Payer: COMMERCIAL

## 2024-11-08 VITALS
DIASTOLIC BLOOD PRESSURE: 89 MMHG | BODY MASS INDEX: 44.4 KG/M2 | HEART RATE: 105 BPM | TEMPERATURE: 97.5 F | RESPIRATION RATE: 24 BRPM | OXYGEN SATURATION: 94 % | SYSTOLIC BLOOD PRESSURE: 135 MMHG | WEIGHT: 250.6 LBS | HEIGHT: 63 IN

## 2024-11-08 DIAGNOSIS — Z12.4 CERVICAL CANCER SCREENING: Primary | ICD-10-CM

## 2024-11-08 DIAGNOSIS — N92.0 MENORRHAGIA WITH REGULAR CYCLE: ICD-10-CM

## 2024-11-08 DIAGNOSIS — M54.59 MECHANICAL LOW BACK PAIN: ICD-10-CM

## 2024-11-08 DIAGNOSIS — E55.9 VITAMIN D DEFICIENCY: ICD-10-CM

## 2024-11-08 LAB
ERYTHROCYTE [DISTWIDTH] IN BLOOD BY AUTOMATED COUNT: 13.2 % (ref 10–15)
HCT VFR BLD AUTO: 36.7 % (ref 35–47)
HGB BLD-MCNC: 11.5 G/DL (ref 11.7–15.7)
MCH RBC QN AUTO: 25.7 PG (ref 26.5–33)
MCHC RBC AUTO-ENTMCNC: 31.3 G/DL (ref 31.5–36.5)
MCV RBC AUTO: 82 FL (ref 78–100)
PLATELET # BLD AUTO: 401 10E3/UL (ref 150–450)
RBC # BLD AUTO: 4.48 10E6/UL (ref 3.8–5.2)
WBC # BLD AUTO: 9.2 10E3/UL (ref 4–11)

## 2024-11-08 RX ORDER — ACETAMINOPHEN 160 MG
1 TABLET,DISINTEGRATING ORAL DAILY
Qty: 100 CAPSULE | Refills: 3 | Status: CANCELLED | OUTPATIENT
Start: 2024-11-08

## 2024-11-08 RX ORDER — CYCLOBENZAPRINE HCL 10 MG
TABLET ORAL
Qty: 30 TABLET | Refills: 1 | Status: CANCELLED | OUTPATIENT
Start: 2024-11-08

## 2024-11-08 RX ORDER — LEVONORGESTREL/ETHIN.ESTRADIOL 0.1-0.02MG
1 TABLET ORAL DAILY
Qty: 90 TABLET | Refills: 3 | Status: SHIPPED | OUTPATIENT
Start: 2024-11-08

## 2024-11-08 RX ORDER — ACETAMINOPHEN 325 MG/1
TABLET ORAL
Qty: 100 TABLET | Refills: 0 | Status: CANCELLED | OUTPATIENT
Start: 2024-11-08

## 2024-11-08 RX ORDER — ACETAMINOPHEN 325 MG/1
325-650 TABLET ORAL EVERY 6 HOURS PRN
Qty: 30 TABLET | Refills: 0 | Status: SHIPPED | OUTPATIENT
Start: 2024-11-08

## 2024-11-08 RX ORDER — CYCLOBENZAPRINE HCL 5 MG
5 TABLET ORAL 3 TIMES DAILY PRN
Qty: 30 TABLET | Refills: 1 | Status: SHIPPED | OUTPATIENT
Start: 2024-11-08

## 2024-11-08 ASSESSMENT — ASTHMA QUESTIONNAIRES
QUESTION_4 LAST FOUR WEEKS HOW OFTEN HAVE YOU USED YOUR RESCUE INHALER OR NEBULIZER MEDICATION (SUCH AS ALBUTEROL): ONE OR TWO TIMES PER DAY
QUESTION_3 LAST FOUR WEEKS HOW OFTEN DID YOUR ASTHMA SYMPTOMS (WHEEZING, COUGHING, SHORTNESS OF BREATH, CHEST TIGHTNESS OR PAIN) WAKE YOU UP AT NIGHT OR EARLIER THAN USUAL IN THE MORNING: TWO OR THREE NIGHTS A WEEK
ACT_TOTALSCORE: 11
QUESTION_2 LAST FOUR WEEKS HOW OFTEN HAVE YOU HAD SHORTNESS OF BREATH: MORE THAN ONCE A DAY
ACT_TOTALSCORE: 11
QUESTION_5 LAST FOUR WEEKS HOW WOULD YOU RATE YOUR ASTHMA CONTROL: SOMEWHAT CONTROLLED
QUESTION_1 LAST FOUR WEEKS HOW MUCH OF THE TIME DID YOUR ASTHMA KEEP YOU FROM GETTING AS MUCH DONE AT WORK, SCHOOL OR AT HOME: SOME OF THE TIME

## 2024-11-08 NOTE — PROGRESS NOTES
"  Assessment & Plan     # Menorrhagia with regular cycle  Comment: See HPI for more details. Patient has a known history of menorrhagia and was previously had an ultrasound done in 2017 which normal. She switched to a progesterone only medication in 2020. Due to the patient's recent menses changes and known menorrhagia history, her current symptoms is most likely menorrhagia. Other differential includes thyroid disorders, PCOS, uterine fibroids, or use of progesterone-only birth control.   - CBC with platelets to assess for anemia   - TSH with free T4 reflex ordered.   - US Pelvis Complete without transvaginal with Abd/Pel Duplex ordered  - Change birth control to levonorgestrel-ethinyl estradiol (AVIANE) 0.1-20 MG-MCG tablet    # Mechanical low back pain  The patient has a history of low back pain and was previously taking Tylenole and Flexeril; the patient wishes to refill these medications.   - Acetaminophen (TYLENOL) 325 MG tablet   - Cyclobenzaprine (FLEXERIL) 5 MG tablet    Vitamin D deficiency  The patient has a known history of Vitamin D deficiency. However, her last Vitamin D, Total screen was back in 2014. Discussed with the patient and her sister about testing for her vitamin D levels before restarting a Vitamin D medication; they expressed understanding and agrees with this plan.  - Vitamin D deficiency screening ordered. Will call patient or sister about results.     BMI  Estimated body mass index is 44.39 kg/m  as calculated from the following:    Height as of this encounter: 1.6 m (5' 3\").    Weight as of this encounter: 113.7 kg (250 lb 9.6 oz).     No follow-ups on file.    Yesi Beth is a 24 year old, presenting for the following health issues:  Abnormal Bleeding Problem (Bleeding for 2 months on and off and 3 wks of excessive bleeding and blood. Cramps hurting - currently on Birth control pills), Medication Request (Med refill request on Birth control pills, Muscle relaxant, tylenol, " "vitamin D. //norethindrone (MICRONOR) 0.35 MG tablet - was discontinued in 5/18/23), and Referral (Patient's sister said their mom and the patient have been trying to fill out forms for the S.S.I (Social security) and were referred to the  in clinic. They have not reach out to the patient and were wondering what was the status on that and if they could get referred to another  to receive help. )        11/8/2024     3:59 PM   Additional Questions   Roomed by KELSEY her AM   Accompanied by sister         11/8/2024    Information    services provided? No        HPI   Na Ignacia Calixto is a 24 year old female, with a PHX of menorrhagia with regular cycle, mechanical low back pain, and Vitamin D deficiency who presents to the clinic with abnormally long menstrual periods. She presented to the clinic with her sister. The patient reports that in the last 2-3 months, her menses have been heavy and persistent. She notes of occasional blood clots. Her menses is associated with abdominal cramping. Her menses this month have persisted in the last 3 weeks and she is now spotting. She typically wear adult diapers during her menses and she notes that she usually changes her diapers 1-2 times during the day. She notes that she would have to wear two diapers at one time due to spillage. She takes a progesterone only birth control. She is not sexually active and was never sexually active.  Defers Pap smear today. Denies of fever, chills, abdominal pain, N/V/D, dysuria, frequency, or urgency. Previous ultrasound in 2017 was normal,         Objective    /89   Pulse 105   Temp 97.5  F (36.4  C) (Tympanic)   Resp 24   Ht 1.6 m (5' 3\")   Wt 113.7 kg (250 lb 9.6 oz)   LMP 11/08/2024 (Exact Date)   SpO2 94%   BMI 44.39 kg/m    Body mass index is 44.39 kg/m .  Physical Exam   Constitutional: Awake, alert, cooperative, no apparent distress, and appears stated age  Eyes: Lids and lashes normal, " conjunctiva normal  ENT: Normocephalic, without obvious abnormality, atraumatic.   Respiratory: No increased work of breathing, good air exchange, clear to auscultation bilaterally, no crackles or wheezing  Cardiovascular: Regular rate and rhythm, normal S1 and S2, no S3 or S4, and no murmur noted  GI: No scars, normal bowel sounds, soft, non-distended, non-tender, no masses palpated, no hepatosplenomegaly  Skin: normal skin color, texture, turgor  Musculoskeletal: There is no redness, warmth, or swelling of the joints.  Full range of motion noted.  Motor strength is 5 out of 5 all extremities bilaterally.  Tone is normal.  Neurologic: Awake, alert, oriented to name, place and time.  Cranial nerves II-XII are grossly intact. Gait is normal.  Neuropsychiatric: General: normal, calm, and normal eye contact     This patient was staffed with Dr. Mikel Barrera.     Signed Electronically by: Laury Murphy DO, PGY1  [facility]

## 2024-11-08 NOTE — PROGRESS NOTES
Preceptor Attestation:    I discussed the patient with the resident and evaluated the patient in person. I have verified the content of the note, which accurately reflects my assessment of the patient and the plan of care.   Supervising Physician:  Mikel Barrera MD.

## 2024-11-09 LAB
T4 FREE SERPL-MCNC: 1.5 NG/DL (ref 0.9–1.7)
TSH SERPL DL<=0.005 MIU/L-ACNC: <0.01 UIU/ML (ref 0.3–4.2)
VIT D+METAB SERPL-MCNC: <6 NG/ML (ref 20–50)

## 2024-11-13 RX ORDER — ERGOCALCIFEROL 1.25 MG/1
50000 CAPSULE, LIQUID FILLED ORAL WEEKLY
Qty: 8 CAPSULE | Refills: 0 | Status: SHIPPED | OUTPATIENT
Start: 2024-11-13 | End: 2025-01-02

## 2024-11-13 RX ORDER — CHOLECALCIFEROL (VITAMIN D3) 50 MCG
1 TABLET ORAL DAILY
Qty: 60 TABLET | Refills: 2 | Status: SHIPPED | OUTPATIENT
Start: 2024-11-13

## 2025-01-09 ENCOUNTER — OFFICE VISIT (OUTPATIENT)
Dept: FAMILY MEDICINE | Facility: CLINIC | Age: 25
End: 2025-01-09
Payer: COMMERCIAL

## 2025-01-09 VITALS
TEMPERATURE: 98.7 F | OXYGEN SATURATION: 99 % | HEIGHT: 64 IN | HEART RATE: 111 BPM | DIASTOLIC BLOOD PRESSURE: 80 MMHG | BODY MASS INDEX: 41.83 KG/M2 | SYSTOLIC BLOOD PRESSURE: 129 MMHG | WEIGHT: 245 LBS | RESPIRATION RATE: 20 BRPM

## 2025-01-09 DIAGNOSIS — Z12.4 CERVICAL CANCER SCREENING: Primary | ICD-10-CM

## 2025-01-09 ASSESSMENT — ASTHMA QUESTIONNAIRES
QUESTION_4 LAST FOUR WEEKS HOW OFTEN HAVE YOU USED YOUR RESCUE INHALER OR NEBULIZER MEDICATION (SUCH AS ALBUTEROL): ONCE A WEEK OR LESS
QUESTION_2 LAST FOUR WEEKS HOW OFTEN HAVE YOU HAD SHORTNESS OF BREATH: ONCE OR TWICE A WEEK
ACT_TOTALSCORE: 19
QUESTION_1 LAST FOUR WEEKS HOW MUCH OF THE TIME DID YOUR ASTHMA KEEP YOU FROM GETTING AS MUCH DONE AT WORK, SCHOOL OR AT HOME: SOME OF THE TIME
ACT_TOTALSCORE: 19
QUESTION_3 LAST FOUR WEEKS HOW OFTEN DID YOUR ASTHMA SYMPTOMS (WHEEZING, COUGHING, SHORTNESS OF BREATH, CHEST TIGHTNESS OR PAIN) WAKE YOU UP AT NIGHT OR EARLIER THAN USUAL IN THE MORNING: ONCE OR TWICE
QUESTION_5 LAST FOUR WEEKS HOW WOULD YOU RATE YOUR ASTHMA CONTROL: WELL CONTROLLED

## 2025-01-09 NOTE — PROGRESS NOTES
"  Assessment & Plan     Cervical cancer screening  Pap smear done today          BMI  Estimated body mass index is 42.05 kg/m  as calculated from the following:    Height as of this encounter: 1.626 m (5' 4\").    Weight as of this encounter: 111.1 kg (245 lb).   Weight management plan: Discussed healthy diet and exercise guidelines      Will call with results of pap smear    No follow-ups on file.    Subjective   Na Ignacia is a 24 year old, presenting for the following health issues:  Pap Smear      1/9/2025     3:28 PM   Additional Questions   Roomed by Miquel ESCOBAR   Accompanied by Sister         1/9/2025    Information    services provided? No     HPI   Pt comes in for initial pap.  No h/o abnormal bleeding or sti.            Review of Systems  Constitutional, HEENT, cardiovascular, pulmonary, gi and gu systems are negative, except as otherwise noted.      Objective    /80   Pulse 111   Temp 98.7  F (37.1  C) (Oral)   Resp 20   Ht 1.626 m (5' 4\")   Wt 111.1 kg (245 lb)   LMP  (LMP Unknown)   SpO2 99%   BMI 42.05 kg/m    Body mass index is 42.05 kg/m .  Physical Exam   GENERAL: alert and no distress  RESP: lungs clear to auscultation - no rales, rhonchi or wheezes  CV: regular rate and rhythm, normal S1 S2, no S3 or S4, no murmur, click or rub, no peripheral edema  ABDOMEN: soft, nontender, no hepatosplenomegaly, no masses and bowel sounds normal   (female) w/bimanual: normal female external genitalia, normal urethral meatus, normal vaginal mucosa, and normal cervix/adnexa/uterus without masses or discharge  SKIN: no suspicious lesions or rashes  PSYCH: mentation appears normal, affect normal/bright            Signed Electronically by: Santosh Paul MD    "

## 2025-01-14 LAB
BKR LAB AP GYN ADEQUACY: NORMAL
BKR LAB AP GYN INTERPRETATION: NORMAL
BKR LAB AP HPV REFLEX: NO
BKR LAB AP LMP: NORMAL
BKR LAB AP PREVIOUS ABNORMAL: NORMAL
PATH REPORT.COMMENTS IMP SPEC: NORMAL
PATH REPORT.COMMENTS IMP SPEC: NORMAL
PATH REPORT.RELEVANT HX SPEC: NORMAL

## 2025-05-27 ENCOUNTER — OFFICE VISIT (OUTPATIENT)
Dept: FAMILY MEDICINE | Facility: CLINIC | Age: 25
End: 2025-05-27
Payer: COMMERCIAL

## 2025-05-27 VITALS
HEART RATE: 104 BPM | DIASTOLIC BLOOD PRESSURE: 83 MMHG | OXYGEN SATURATION: 96 % | HEIGHT: 63 IN | RESPIRATION RATE: 20 BRPM | SYSTOLIC BLOOD PRESSURE: 126 MMHG | TEMPERATURE: 97.2 F | BODY MASS INDEX: 41.75 KG/M2 | WEIGHT: 235.6 LBS

## 2025-05-27 DIAGNOSIS — Z65.3 PROBLEMS RELATED TO OTHER LEGAL CIRCUMSTANCES: ICD-10-CM

## 2025-05-27 DIAGNOSIS — F70 MILD INTELLECTUAL DISABILITY: Primary | ICD-10-CM

## 2025-05-27 PROCEDURE — 3079F DIAST BP 80-89 MM HG: CPT | Performed by: FAMILY MEDICINE

## 2025-05-27 PROCEDURE — 3074F SYST BP LT 130 MM HG: CPT | Performed by: FAMILY MEDICINE

## 2025-05-27 PROCEDURE — 99213 OFFICE O/P EST LOW 20 MIN: CPT | Performed by: FAMILY MEDICINE

## 2025-05-27 ASSESSMENT — ASTHMA QUESTIONNAIRES
QUESTION_4 LAST FOUR WEEKS HOW OFTEN HAVE YOU USED YOUR RESCUE INHALER OR NEBULIZER MEDICATION (SUCH AS ALBUTEROL): NOT AT ALL
QUESTION_3 LAST FOUR WEEKS HOW OFTEN DID YOUR ASTHMA SYMPTOMS (WHEEZING, COUGHING, SHORTNESS OF BREATH, CHEST TIGHTNESS OR PAIN) WAKE YOU UP AT NIGHT OR EARLIER THAN USUAL IN THE MORNING: NOT AT ALL
QUESTION_5 LAST FOUR WEEKS HOW WOULD YOU RATE YOUR ASTHMA CONTROL: COMPLETELY CONTROLLED
ACT_TOTALSCORE: 22
QUESTION_1 LAST FOUR WEEKS HOW MUCH OF THE TIME DID YOUR ASTHMA KEEP YOU FROM GETTING AS MUCH DONE AT WORK, SCHOOL OR AT HOME: MOST OF THE TIME
QUESTION_2 LAST FOUR WEEKS HOW OFTEN HAVE YOU HAD SHORTNESS OF BREATH: NOT AT ALL

## 2025-06-05 ENCOUNTER — PATIENT OUTREACH (OUTPATIENT)
Dept: CARE COORDINATION | Facility: CLINIC | Age: 25
End: 2025-06-05
Payer: COMMERCIAL

## 2025-06-05 NOTE — TELEPHONE ENCOUNTER
Received PCP referral that Pt may need assistance with ssi appeal and possibly discuss guardianship needs writer reached out to Pt at [phone]. Writer was greeted by SHAHID. HINA for Pt to call back.

## 2025-06-05 NOTE — TELEPHONE ENCOUNTER
Able to reach Pt's mother Halima Calixto who says that Pt is asleep. Mom says that they are not concerned about the guardianship at this time as mom is a representative for Pt. Mom would like to have help with the ssi appeals. Writer informed mom of [facility] and gave  Fransisca Gill's  name and number. Writer also referred Pt to Fransisca via email.    [facility]   [address] [place] [address], [address]  [place] [phone]

## 2025-06-09 NOTE — PROGRESS NOTES
"Assessment & Plan   Encounter for completion of form.    -- I completed a Medical Opinion form attesting to Ms. Calixto's intellectual disability and PTSD.  -- She seeks legal assistance for appeal to her social security denial.  Referral placed.  Advise they also discuss guardianship needs.    Follow-up as needed.    Subjective   Na Ignacia Calixto is a 25 year old female with history including migraines, asthma, PTSD, and intellectual disability who presents for completion of a medical opinion form.    Ms. Calixto has a history of PTSD. She was previously set up with therapy for PTSD, which was beneficial in developing coping skills. Although she has not continued therapy, she has been able to maintain the skills learned.  She also has a history of intellectual disability; IQ testing in 8/23/18 showed IQ of 60.  Ms. Calixto experiences challenges with communication, transportation, and self-care. Social skills and home living skills are reported to be good. She requires assistance with transportation and making appointments. She has previously worked at [employer].  She reports no history of depression or anxiety. There are no concerns of her being taken advantage of by others.    Social: She reports that she has been smoking cigars. She has never used smokeless tobacco. She reports that she does not drink alcohol and does not use drugs.    Objective   Vitals: /83   Pulse 104   Temp 97.2  F (36.2  C) (Tympanic)   Resp 20   Ht 1.6 m (5' 2.99\")   Wt 106.9 kg (235 lb 9.6 oz)   LMP 05/20/2025 (Approximate)   SpO2 96%   BMI 41.75 kg/m    General: Pleasant. Young woman. No distress.  Psych: Appropriate grooming and hygiene. Speech normal rate. Appropriate mood and affect.    -----------------------  Portions of this note were created with the assistance of an ambient AI scribe.  The content was reviewed and verified for accuracy by the authoring provider.  "
No

## 2025-07-23 ENCOUNTER — PATIENT OUTREACH (OUTPATIENT)
Dept: CARE COORDINATION | Facility: CLINIC | Age: 25
End: 2025-07-23
Payer: COMMERCIAL

## 2025-08-06 ENCOUNTER — PATIENT OUTREACH (OUTPATIENT)
Dept: CARE COORDINATION | Facility: CLINIC | Age: 25
End: 2025-08-06
Payer: COMMERCIAL